# Patient Record
Sex: MALE | Race: OTHER | HISPANIC OR LATINO | URBAN - METROPOLITAN AREA
[De-identification: names, ages, dates, MRNs, and addresses within clinical notes are randomized per-mention and may not be internally consistent; named-entity substitution may affect disease eponyms.]

---

## 2017-01-09 ENCOUNTER — OUTPATIENT (OUTPATIENT)
Dept: OUTPATIENT SERVICES | Facility: HOSPITAL | Age: 52
LOS: 1 days | End: 2017-01-09
Payer: SELF-PAY

## 2017-01-09 ENCOUNTER — APPOINTMENT (OUTPATIENT)
Dept: FAMILY MEDICINE | Facility: HOSPITAL | Age: 52
End: 2017-01-09

## 2017-01-09 VITALS — DIASTOLIC BLOOD PRESSURE: 86 MMHG | SYSTOLIC BLOOD PRESSURE: 128 MMHG

## 2017-01-09 VITALS
HEART RATE: 59 BPM | RESPIRATION RATE: 14 BRPM | WEIGHT: 161 LBS | TEMPERATURE: 98 F | BODY MASS INDEX: 27.15 KG/M2 | DIASTOLIC BLOOD PRESSURE: 94 MMHG | HEIGHT: 64.5 IN | OXYGEN SATURATION: 99 % | SYSTOLIC BLOOD PRESSURE: 144 MMHG

## 2017-01-09 PROCEDURE — G0463: CPT

## 2017-01-16 ENCOUNTER — FORM ENCOUNTER (OUTPATIENT)
Age: 52
End: 2017-01-16

## 2017-01-17 ENCOUNTER — OUTPATIENT (OUTPATIENT)
Dept: OUTPATIENT SERVICES | Facility: HOSPITAL | Age: 52
LOS: 1 days | End: 2017-01-17
Payer: SELF-PAY

## 2017-01-17 PROCEDURE — 87340 HEPATITIS B SURFACE AG IA: CPT

## 2017-01-17 PROCEDURE — 84443 ASSAY THYROID STIM HORMONE: CPT

## 2017-01-17 PROCEDURE — 84439 ASSAY OF FREE THYROXINE: CPT

## 2017-01-17 PROCEDURE — 36415 COLL VENOUS BLD VENIPUNCTURE: CPT

## 2017-01-17 PROCEDURE — 76700 US EXAM ABDOM COMPLETE: CPT

## 2017-01-17 PROCEDURE — 82607 VITAMIN B-12: CPT

## 2017-01-17 PROCEDURE — 82306 VITAMIN D 25 HYDROXY: CPT

## 2017-01-17 PROCEDURE — 86140 C-REACTIVE PROTEIN: CPT

## 2017-01-17 PROCEDURE — 82652 VIT D 1 25-DIHYDROXY: CPT

## 2017-01-17 PROCEDURE — 76700 US EXAM ABDOM COMPLETE: CPT | Mod: 26

## 2017-01-17 PROCEDURE — 80076 HEPATIC FUNCTION PANEL: CPT

## 2017-01-17 PROCEDURE — 87902 NFCT AGT GNTYP ALYS HEP C: CPT

## 2018-04-02 ENCOUNTER — CHART COPY (OUTPATIENT)
Age: 53
End: 2018-04-02

## 2018-04-11 ENCOUNTER — APPOINTMENT (OUTPATIENT)
Dept: FAMILY MEDICINE | Facility: HOSPITAL | Age: 53
End: 2018-04-11

## 2018-04-11 ENCOUNTER — OUTPATIENT (OUTPATIENT)
Dept: OUTPATIENT SERVICES | Facility: HOSPITAL | Age: 53
LOS: 1 days | End: 2018-04-11
Payer: SELF-PAY

## 2018-04-11 VITALS
WEIGHT: 158 LBS | HEART RATE: 65 BPM | SYSTOLIC BLOOD PRESSURE: 139 MMHG | BODY MASS INDEX: 26.7 KG/M2 | TEMPERATURE: 98.4 F | DIASTOLIC BLOOD PRESSURE: 89 MMHG | RESPIRATION RATE: 16 BRPM | OXYGEN SATURATION: 98 %

## 2018-04-11 DIAGNOSIS — Z00.00 ENCOUNTER FOR GENERAL ADULT MEDICAL EXAMINATION WITHOUT ABNORMAL FINDINGS: ICD-10-CM

## 2018-04-11 PROCEDURE — 36415 COLL VENOUS BLD VENIPUNCTURE: CPT

## 2018-04-11 PROCEDURE — 80061 LIPID PANEL: CPT

## 2018-04-11 PROCEDURE — 80053 COMPREHEN METABOLIC PANEL: CPT

## 2018-04-11 PROCEDURE — G0463: CPT

## 2018-04-11 PROCEDURE — 83036 HEMOGLOBIN GLYCOSYLATED A1C: CPT

## 2018-04-11 PROCEDURE — 85025 COMPLETE CBC W/AUTO DIFF WBC: CPT

## 2018-04-12 DIAGNOSIS — K64.4 RESIDUAL HEMORRHOIDAL SKIN TAGS: ICD-10-CM

## 2018-04-12 DIAGNOSIS — H52.10 MYOPIA, UNSPECIFIED EYE: ICD-10-CM

## 2018-04-24 ENCOUNTER — APPOINTMENT (OUTPATIENT)
Dept: OPHTHALMOLOGY | Facility: CLINIC | Age: 53
End: 2018-04-24

## 2018-04-24 ENCOUNTER — OUTPATIENT (OUTPATIENT)
Dept: OUTPATIENT SERVICES | Facility: HOSPITAL | Age: 53
LOS: 1 days | End: 2018-04-24

## 2018-09-12 DIAGNOSIS — H11.042 PERIPHERAL PTERYGIUM, STATIONARY, LEFT EYE: ICD-10-CM

## 2019-12-27 ENCOUNTER — FORM ENCOUNTER (OUTPATIENT)
Age: 54
End: 2019-12-27

## 2019-12-28 ENCOUNTER — OUTPATIENT (OUTPATIENT)
Dept: OUTPATIENT SERVICES | Facility: HOSPITAL | Age: 54
LOS: 1 days | End: 2019-12-28
Payer: SELF-PAY

## 2019-12-28 ENCOUNTER — APPOINTMENT (OUTPATIENT)
Dept: FAMILY MEDICINE | Facility: HOSPITAL | Age: 54
End: 2019-12-28
Payer: SELF-PAY

## 2019-12-28 DIAGNOSIS — Z00.00 ENCOUNTER FOR GENERAL ADULT MEDICAL EXAMINATION WITHOUT ABNORMAL FINDINGS: ICD-10-CM

## 2019-12-28 DIAGNOSIS — K64.4 RESIDUAL HEMORRHOIDAL SKIN TAGS: ICD-10-CM

## 2019-12-28 PROCEDURE — G0463: CPT

## 2019-12-28 PROCEDURE — 73562 X-RAY EXAM OF KNEE 3: CPT | Mod: 26,RT

## 2019-12-28 PROCEDURE — 90471 IMMUNIZATION ADMIN: CPT

## 2019-12-28 PROCEDURE — 73562 X-RAY EXAM OF KNEE 3: CPT

## 2019-12-28 RX ORDER — AVOBENZONE, HOMOSALATE, OCTISALATE, OCTOCRYLENE, AND OXYBENZONE 29.4; 147; 49; 25.4; 58.8 MG/ML; MG/ML; MG/ML; MG/ML; MG/ML
51.7 LOTION TOPICAL DAILY
Qty: 1 | Refills: 0 | Status: COMPLETED | COMMUNITY
Start: 2018-04-11 | End: 2019-12-28

## 2019-12-28 RX ORDER — HYDROCORTISONE 25 MG/G
2.5 CREAM TOPICAL DAILY
Qty: 1 | Refills: 0 | Status: COMPLETED | COMMUNITY
Start: 2018-04-11 | End: 2019-12-28

## 2019-12-28 RX ORDER — PROPYLENE GLYCOL/PEG 400 0.3 %-0.4%
0.025-0.3 DROPS OPHTHALMIC (EYE) TWICE DAILY
Qty: 1 | Refills: 0 | Status: COMPLETED | COMMUNITY
Start: 2018-04-11 | End: 2019-12-28

## 2019-12-28 NOTE — ASSESSMENT
[FreeTextEntry1] : 54 year old male as above\par \par #right knee pain\par - xray of right knee\par - ibuprofen 600mg TID for pain\par - apply knee brace from OTC\par - stretching exercise discussed and demonstrated with patient\par \par #Preventitive measure\par - tdap vaccination given\par - pt tolerated vaccination well\par \par \par f/u with PCP in 2 weeks \par \par case discussed with Dr. Martino

## 2019-12-28 NOTE — HISTORY OF PRESENT ILLNESS
[FreeTextEntry8] : 54 year old male here for tdap vaccination and also c/o of right knee pain for x 2 weeks. Pt denies any trauma, but reports he had 8/10 pain which has improved to 5/10 and is more aggravated with movement and better with rest. Pt reports taking Tylenol which help support, however, reports occasionally hearing a clicking noise with his right knee. 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 Pt denies any trauma or falls. pt has no further complaints.

## 2019-12-28 NOTE — PHYSICAL EXAM
[No Acute Distress] : no acute distress [Well Nourished] : well nourished [Well Developed] : well developed [Well-Appearing] : well-appearing [Normal Outer Ear/Nose] : the outer ears and nose were normal in appearance [Normal Oropharynx] : the oropharynx was normal [No JVD] : no jugular venous distention [No Lymphadenopathy] : no lymphadenopathy [No Respiratory Distress] : no respiratory distress  [Supple] : supple [Clear to Auscultation] : lungs were clear to auscultation bilaterally [No Accessory Muscle Use] : no accessory muscle use [Normal S1, S2] : normal S1 and S2 [Regular Rhythm] : with a regular rhythm [Normal Rate] : normal rate  [Non-distended] : non-distended [Non Tender] : non-tender [Soft] : abdomen soft [Normal Bowel Sounds] : normal bowel sounds [No HSM] : no HSM [Grossly Normal Strength/Tone] : grossly normal strength/tone [No Joint Swelling] : no joint swelling [No Focal Deficits] : no focal deficits [Coordination Grossly Intact] : coordination grossly intact [No Rash] : no rash [Normal Insight/Judgement] : insight and judgment were intact [Normal Affect] : the affect was normal [Normal Gait] : normal gait [de-identified] : no tenderness, swelling or erythema of bilateral knees, range of movement intact of bilateral extremities. no weakness.

## 2019-12-30 DIAGNOSIS — M25.561 PAIN IN RIGHT KNEE: ICD-10-CM

## 2019-12-30 DIAGNOSIS — Z23 ENCOUNTER FOR IMMUNIZATION: ICD-10-CM

## 2020-01-10 ENCOUNTER — APPOINTMENT (OUTPATIENT)
Dept: FAMILY MEDICINE | Facility: HOSPITAL | Age: 55
End: 2020-01-10

## 2020-01-10 ENCOUNTER — OUTPATIENT (OUTPATIENT)
Dept: OUTPATIENT SERVICES | Facility: HOSPITAL | Age: 55
LOS: 1 days | End: 2020-01-10
Payer: COMMERCIAL

## 2020-01-10 ENCOUNTER — OUTPATIENT (OUTPATIENT)
Dept: OUTPATIENT SERVICES | Facility: HOSPITAL | Age: 55
LOS: 1 days | End: 2020-01-10
Payer: SELF-PAY

## 2020-01-10 VITALS
OXYGEN SATURATION: 96 % | RESPIRATION RATE: 16 BRPM | SYSTOLIC BLOOD PRESSURE: 142 MMHG | TEMPERATURE: 98.5 F | WEIGHT: 164 LBS | HEART RATE: 77 BPM | DIASTOLIC BLOOD PRESSURE: 90 MMHG | BODY MASS INDEX: 27.72 KG/M2

## 2020-01-10 DIAGNOSIS — Z00.00 ENCOUNTER FOR GENERAL ADULT MEDICAL EXAMINATION WITHOUT ABNORMAL FINDINGS: ICD-10-CM

## 2020-01-10 PROCEDURE — G0463: CPT

## 2020-01-10 NOTE — PLAN
[FreeTextEntry1] : 54M w/PMH of obesity, elevated lipids, sciatica presents for F/U R knee pain\par \par #R knee pain\par - Continue Ibuprofen PRN\par - Continue knee brace\par - Physical therapy referral for strengthening\par \par #HM\par #Hx of transaminitis\par - F/U CBC, CMP, lipids, A1c\par - RTC in 2 weeks for CPE\par \par Case discussed w/ Dr Child

## 2020-01-10 NOTE — PHYSICAL EXAM
[No Acute Distress] : no acute distress [Well Nourished] : well nourished [Well Developed] : well developed [Well-Appearing] : well-appearing [de-identified] : FADIR and JULIETTE negative\par No gait abnormalities\par Knee: No warmth, No TTP; No clicking elicited on active/passive movement; Negative Lachman's; Negative posterior Drawers; Negative Thessaly

## 2020-01-10 NOTE — REVIEW OF SYSTEMS
[Joint Pain] : joint pain [Fever] : no fever [Joint Stiffness] : no joint stiffness [Chills] : no chills [Muscle Weakness] : no muscle weakness [Muscle Pain] : no muscle pain [Back Pain] : no back pain [Joint Swelling] : no joint swelling

## 2020-01-10 NOTE — HISTORY OF PRESENT ILLNESS
[FreeTextEntry1] : F/U knee pain [de-identified] : 54M w/PMH of obesity, elevated lipids, sciatica presents for F/U R knee pain. Last visit c/o 2 weeks of waxing / waning 5/10 pain, worse w/ movement better w/ rest or Tylenol. Denied trauma. Endorsed clicking. Today pt reports knee pain somewhat improved. Adherent w/ Ibuprofen 600mg q8 PRN, but knee pain mostly resolves within <10mins so has not taken much. Reports wearing brace helps. Xray of knee negative. Otherwise no complaints.

## 2020-01-14 ENCOUNTER — OUTPATIENT (OUTPATIENT)
Dept: OUTPATIENT SERVICES | Facility: HOSPITAL | Age: 55
LOS: 1 days | End: 2020-01-14

## 2020-01-14 DIAGNOSIS — M25.561 PAIN IN RIGHT KNEE: ICD-10-CM

## 2020-01-14 DIAGNOSIS — R74.0 NONSPECIFIC ELEVATION OF LEVELS OF TRANSAMINASE AND LACTIC ACID DEHYDROGENASE [LDH]: ICD-10-CM

## 2020-01-14 DIAGNOSIS — Z12.11 ENCOUNTER FOR SCREENING FOR MALIGNANT NEOPLASM OF COLON: ICD-10-CM

## 2020-01-14 LAB
ALBUMIN SERPL ELPH-MCNC: 4.3 G/DL
ALP BLD-CCNC: 97 U/L
ALT SERPL-CCNC: 38 U/L
ANION GAP SERPL CALC-SCNC: 11 MMOL/L
AST SERPL-CCNC: 27 U/L
BILIRUB SERPL-MCNC: 0.9 MG/DL
BUN SERPL-MCNC: 14 MG/DL
CALCIUM SERPL-MCNC: 9.1 MG/DL
CHLORIDE SERPL-SCNC: 106 MMOL/L
CO2 SERPL-SCNC: 25 MMOL/L
CREAT SERPL-MCNC: 0.86 MG/DL
ESTIMATED AVERAGE GLUCOSE: 94 MG/DL
GLUCOSE SERPL-MCNC: 98 MG/DL
HBA1C MFR BLD HPLC: 4.9 %
POTASSIUM SERPL-SCNC: 4.4 MMOL/L
PROT SERPL-MCNC: 7.1 G/DL
SODIUM SERPL-SCNC: 142 MMOL/L

## 2020-01-15 PROCEDURE — 83036 HEMOGLOBIN GLYCOSYLATED A1C: CPT

## 2020-01-15 PROCEDURE — G0463: CPT

## 2020-01-15 PROCEDURE — 80061 LIPID PANEL: CPT

## 2020-01-15 PROCEDURE — 80053 COMPREHEN METABOLIC PANEL: CPT

## 2020-01-15 PROCEDURE — 82274 ASSAY TEST FOR BLOOD FECAL: CPT

## 2020-01-28 LAB
BASOPHILS # BLD AUTO: 0.07 K/UL
BASOPHILS NFR BLD AUTO: 1.2 %
CHOLEST SERPL-MCNC: 261 MG/DL
CHOLEST/HDLC SERPL: 6.4 RATIO
EOSINOPHIL # BLD AUTO: 0.32 K/UL
EOSINOPHIL NFR BLD AUTO: 5.5 %
HCT VFR BLD CALC: 44.9 %
HDLC SERPL-MCNC: 41 MG/DL
HEMOCCULT STL QL IA: NEGATIVE
HGB BLD-MCNC: 15.3 G/DL
IMM GRANULOCYTES NFR BLD AUTO: 0.7 %
LDLC SERPL CALC-MCNC: 179 MG/DL
LYMPHOCYTES # BLD AUTO: 2.14 K/UL
LYMPHOCYTES NFR BLD AUTO: 36.5 %
MAN DIFF?: NORMAL
MCHC RBC-ENTMCNC: 31 PG
MCHC RBC-ENTMCNC: 34.1 GM/DL
MCV RBC AUTO: 90.9 FL
MONOCYTES # BLD AUTO: 0.49 K/UL
MONOCYTES NFR BLD AUTO: 8.4 %
NEUTROPHILS # BLD AUTO: 2.8 K/UL
NEUTROPHILS NFR BLD AUTO: 47.7 %
PLATELET # BLD AUTO: 377 K/UL
RBC # BLD: 4.94 M/UL
RBC # FLD: 13.1 %
TRIGL SERPL-MCNC: 206 MG/DL
WBC # FLD AUTO: 5.86 K/UL

## 2020-01-29 ENCOUNTER — OUTPATIENT (OUTPATIENT)
Dept: OUTPATIENT SERVICES | Facility: HOSPITAL | Age: 55
LOS: 1 days | End: 2020-01-29
Payer: SELF-PAY

## 2020-01-29 ENCOUNTER — APPOINTMENT (OUTPATIENT)
Dept: FAMILY MEDICINE | Facility: HOSPITAL | Age: 55
End: 2020-01-29

## 2020-01-29 VITALS
DIASTOLIC BLOOD PRESSURE: 95 MMHG | BODY MASS INDEX: 28.05 KG/M2 | HEART RATE: 68 BPM | SYSTOLIC BLOOD PRESSURE: 144 MMHG | WEIGHT: 166 LBS | OXYGEN SATURATION: 98 % | TEMPERATURE: 97.5 F | RESPIRATION RATE: 16 BRPM

## 2020-01-29 DIAGNOSIS — Z00.00 ENCOUNTER FOR GENERAL ADULT MEDICAL EXAMINATION WITHOUT ABNORMAL FINDINGS: ICD-10-CM

## 2020-01-29 DIAGNOSIS — R20.0 ANESTHESIA OF SKIN: ICD-10-CM

## 2020-01-29 DIAGNOSIS — Z12.11 ENCOUNTER FOR SCREENING FOR MALIGNANT NEOPLASM OF COLON: ICD-10-CM

## 2020-01-29 DIAGNOSIS — Z23 ENCOUNTER FOR IMMUNIZATION: ICD-10-CM

## 2020-01-29 DIAGNOSIS — E78.5 HYPERLIPIDEMIA, UNSPECIFIED: ICD-10-CM

## 2020-01-29 DIAGNOSIS — Z86.19 PERSONAL HISTORY OF OTHER INFECTIOUS AND PARASITIC DISEASES: ICD-10-CM

## 2020-01-29 PROCEDURE — G0463: CPT

## 2020-01-29 NOTE — REVIEW OF SYSTEMS
[Joint Pain] : joint pain [Fever] : no fever [Chills] : no chills [Chest Pain] : no chest pain [Palpitations] : no palpitations [Shortness Of Breath] : no shortness of breath [Abdominal Pain] : no abdominal pain [Cough] : no cough [Vomiting] : no vomiting [Nausea] : no nausea [Constipation] : no constipation [Diarrhea] : no diarrhea [Muscle Weakness] : no muscle weakness [Joint Stiffness] : no joint stiffness [Muscle Pain] : no muscle pain [Joint Swelling] : no joint swelling [Back Pain] : no back pain

## 2020-01-29 NOTE — HISTORY OF PRESENT ILLNESS
[FreeTextEntry1] : CPE [de-identified] : 54M w/PMH of obesity, HLD, sciatica presents for CPE. Since last visit, R knee pain improved. Endorses adherence w/ ibuprofen PRN. Attended PT, feels exercises greatly helping. January 2020 labs significant for LDL of 179. Pt has no complaints at this time and denies fever/chills, NVD, weight loss/gain, CP, SoB.

## 2020-01-30 DIAGNOSIS — I10 ESSENTIAL (PRIMARY) HYPERTENSION: ICD-10-CM

## 2020-03-28 ENCOUNTER — EMERGENCY (EMERGENCY)
Facility: HOSPITAL | Age: 55
LOS: 1 days | Discharge: HOME | End: 2020-03-28
Attending: EMERGENCY MEDICINE | Admitting: EMERGENCY MEDICINE
Payer: MEDICAID

## 2020-03-28 VITALS
HEART RATE: 88 BPM | RESPIRATION RATE: 16 BRPM | DIASTOLIC BLOOD PRESSURE: 99 MMHG | OXYGEN SATURATION: 98 % | SYSTOLIC BLOOD PRESSURE: 160 MMHG

## 2020-03-28 VITALS
OXYGEN SATURATION: 98 % | HEART RATE: 87 BPM | DIASTOLIC BLOOD PRESSURE: 115 MMHG | HEIGHT: 66 IN | WEIGHT: 162.04 LBS | SYSTOLIC BLOOD PRESSURE: 195 MMHG | TEMPERATURE: 98 F | RESPIRATION RATE: 16 BRPM

## 2020-03-28 DIAGNOSIS — I10 ESSENTIAL (PRIMARY) HYPERTENSION: ICD-10-CM

## 2020-03-28 PROCEDURE — 99283 EMERGENCY DEPT VISIT LOW MDM: CPT

## 2020-03-28 RX ORDER — ALPRAZOLAM 0.25 MG
0.25 TABLET ORAL ONCE
Refills: 0 | Status: DISCONTINUED | OUTPATIENT
Start: 2020-03-28 | End: 2020-03-28

## 2020-03-28 RX ORDER — ALPRAZOLAM 0.25 MG
1 TABLET ORAL
Qty: 10 | Refills: 0
Start: 2020-03-28 | End: 2020-04-01

## 2020-03-28 RX ADMIN — Medication 0.25 MILLIGRAM(S): at 20:05

## 2020-03-28 NOTE — ED PROVIDER NOTE - PATIENT PORTAL LINK FT
You can access the FollowMyHealth Patient Portal offered by [facility] by registering at the following website: [email or web address]. By joining [facility]’s FollowMyHealth portal, you will also be able to view your health information using other applications (apps) compatible with our system.

## 2020-03-28 NOTE — ED PROVIDER NOTE - NSFOLLOWUPCLINICS_GEN_ALL_ED_FT
[facility] Practice Clinic  Family Medicine  [address] [place] [id number]  Phone: [phone]  Fax:   Follow Up Time:

## 2020-03-28 NOTE — ED PROVIDER NOTE - CLINICAL SUMMARY MEDICAL DECISION MAKING FREE TEXT BOX
pt presented with asymptomatic HTN, pt also under emotional stress due loss of parent yesterday. pt reassured and prescribe mild anxiolytic for short period and f/u with PMD

## 2020-03-28 NOTE — ED ADULT NURSE NOTE - NSIMPLEMENTINTERV_GEN_ALL_ED
today
Implemented All Universal Safety Interventions:  [place] to call system. Call bell, personal items and telephone within reach. Instruct patient to call for assistance. Room bathroom lighting operational. Non-slip footwear when patient is off stretcher. Physically safe environment: no spills, clutter or unnecessary equipment. Stretcher in lowest position, wheels locked, appropriate side rails in place.

## 2020-03-28 NOTE — ED ADULT NURSE NOTE - OBJECTIVE STATEMENT
Pt presents to the ER complaining of high blood pressure. Pt states that he took his blood pressure and it was extremely high at home.

## 2020-03-28 NOTE — ED PROVIDER NOTE - OBJECTIVE STATEMENT
53 y/o male with no sig pmhx presents to ed c/o his BP is high, states he is under lot of stress, his father  yesterday in his country and he can not go.   denies any complaints to chest pain, headache or SOB, he was diagnosed with htn  a month ago and was told to make life style modifications and than reevaluate .

## 2020-03-31 ENCOUNTER — OUTPATIENT (OUTPATIENT)
Dept: OUTPATIENT SERVICES | Facility: HOSPITAL | Age: 55
LOS: 1 days | End: 2020-03-31
Payer: SELF-PAY

## 2020-03-31 ENCOUNTER — APPOINTMENT (OUTPATIENT)
Dept: FAMILY MEDICINE | Facility: HOSPITAL | Age: 55
End: 2020-03-31

## 2020-03-31 VITALS
HEART RATE: 79 BPM | BODY MASS INDEX: 27.72 KG/M2 | OXYGEN SATURATION: 96 % | RESPIRATION RATE: 16 BRPM | TEMPERATURE: 98.1 F | DIASTOLIC BLOOD PRESSURE: 84 MMHG | SYSTOLIC BLOOD PRESSURE: 144 MMHG | WEIGHT: 164 LBS

## 2020-03-31 DIAGNOSIS — Z00.00 ENCOUNTER FOR GENERAL ADULT MEDICAL EXAMINATION WITHOUT ABNORMAL FINDINGS: ICD-10-CM

## 2020-03-31 PROBLEM — Z78.9 OTHER SPECIFIED HEALTH STATUS: Chronic | Status: ACTIVE | Noted: 2020-03-28

## 2020-03-31 PROCEDURE — G0463: CPT

## 2020-03-31 RX ORDER — IBUPROFEN 600 MG/1
600 TABLET, FILM COATED ORAL
Qty: 21 | Refills: 0 | Status: COMPLETED | COMMUNITY
Start: 2019-12-28 | End: 2020-03-31

## 2020-03-31 NOTE — ASSESSMENT
[FreeTextEntry1] : 55 y/o M PMHx HLD and sciatica presenting for f/u after ED visit last week, 3/27, for hypertensive emergency /115.

## 2020-03-31 NOTE — PLAN
[FreeTextEntry1] : HTN\par -Start HCTZ 12.5mg PO QD\par -Pt instructed to monitor BP with home cuff\par -Will call pt in 2 weeks to f/u\par -Make f/u visit in 1 month\par \par Bereavement \par -SW referral and appt made with Janet \par -Janet saw pt during appt and will call him this week, 4/1\par \par

## 2020-03-31 NOTE — PHYSICAL EXAM
[No Acute Distress] : no acute distress [Well Nourished] : well nourished [Well-Appearing] : well-appearing [Normal Sclera/Conjunctiva] : normal sclera/conjunctiva [PERRL] : pupils equal round and reactive to light [EOMI] : extraocular movements intact [No Respiratory Distress] : no respiratory distress  [Clear to Auscultation] : lungs were clear to auscultation bilaterally [Normal Rate] : normal rate  [Regular Rhythm] : with a regular rhythm [Normal S1, S2] : normal S1 and S2 [Soft] : abdomen soft [Non Tender] : non-tender [Non-distended] : non-distended [Normal Bowel Sounds] : normal bowel sounds [Normal Affect] : the affect was normal [Normal Insight/Judgement] : insight and judgment were intact

## 2020-03-31 NOTE — REVIEW OF SYSTEMS
[Fever] : no fever [Chills] : no chills [Pain] : no pain [Vision Problems] : no vision problems [Chest Pain] : no chest pain [Palpitations] : no palpitations [Shortness Of Breath] : no shortness of breath [Cough] : no cough [Abdominal Pain] : no abdominal pain [Nausea] : no nausea [Vomiting] : no vomiting [Suicidal] : not suicidal [Anxiety] : no anxiety [Depression] : no depression

## 2020-03-31 NOTE — HISTORY OF PRESENT ILLNESS
[FreeTextEntry8] : 53 y/o M PMHx HLD and sciatica presenting for f/u after ED visit last week, 3/27, for hypertensive emergency /115. Pt also had c/o HA and tingling in hands at that time. Discharged with alprazolam 0.25mg PRN; pt's father passed away the day before in his home country. Today feeling well. Denies HA, vision disturbance, tingling in hands, CP, SOB, cough, or fever. He has a home BP cuff and notes his BP averages around 150 systolic. When he gets stressed he notices his HA appear and his BP goes up. PHQ-9 score in office: 2.

## 2020-04-01 DIAGNOSIS — I10 ESSENTIAL (PRIMARY) HYPERTENSION: ICD-10-CM

## 2020-04-01 DIAGNOSIS — Z71.89 OTHER SPECIFIED COUNSELING: ICD-10-CM

## 2021-01-26 ENCOUNTER — OUTPATIENT (OUTPATIENT)
Dept: OUTPATIENT SERVICES | Facility: HOSPITAL | Age: 56
LOS: 1 days | End: 2021-01-26
Payer: SELF-PAY

## 2021-01-26 ENCOUNTER — APPOINTMENT (OUTPATIENT)
Dept: FAMILY MEDICINE | Facility: HOSPITAL | Age: 56
End: 2021-01-26

## 2021-01-26 VITALS
RESPIRATION RATE: 14 BRPM | SYSTOLIC BLOOD PRESSURE: 154 MMHG | DIASTOLIC BLOOD PRESSURE: 92 MMHG | OXYGEN SATURATION: 97 % | BODY MASS INDEX: 27.21 KG/M2 | HEART RATE: 72 BPM | TEMPERATURE: 97.1 F | WEIGHT: 161 LBS

## 2021-01-26 VITALS — SYSTOLIC BLOOD PRESSURE: 129 MMHG | DIASTOLIC BLOOD PRESSURE: 84 MMHG

## 2021-01-26 DIAGNOSIS — Z00.00 ENCOUNTER FOR GENERAL ADULT MEDICAL EXAMINATION WITHOUT ABNORMAL FINDINGS: ICD-10-CM

## 2021-01-26 PROCEDURE — G0463: CPT

## 2021-01-26 PROCEDURE — 85025 COMPLETE CBC W/AUTO DIFF WBC: CPT

## 2021-01-26 PROCEDURE — 80061 LIPID PANEL: CPT

## 2021-01-26 PROCEDURE — 80053 COMPREHEN METABOLIC PANEL: CPT

## 2021-01-26 PROCEDURE — 84443 ASSAY THYROID STIM HORMONE: CPT

## 2021-01-26 PROCEDURE — 83036 HEMOGLOBIN GLYCOSYLATED A1C: CPT

## 2021-01-26 PROCEDURE — 82274 ASSAY TEST FOR BLOOD FECAL: CPT

## 2021-01-27 RX ORDER — HYDROCHLOROTHIAZIDE 12.5 MG/1
12.5 CAPSULE ORAL DAILY
Qty: 30 | Refills: 3 | Status: DISCONTINUED | COMMUNITY
Start: 2020-03-31 | End: 2021-01-27

## 2021-01-29 DIAGNOSIS — I10 ESSENTIAL (PRIMARY) HYPERTENSION: ICD-10-CM

## 2021-01-29 NOTE — PLAN
[FreeTextEntry1] : 55M w/PMH of obesity, HLD, sciatica presents for CPE. \par \par # Visual disturbances\par - Refer to opthalmology\par \par #HTN\par - Repeat BP in clinic 129/84\par - Will bring machine in next visit\par - RTC in 1 months for F/U and labs\par \par #Need for flu vaccine \par - Flu vaccine today \par \par #HM\par - Weight: goal BMI <25. \par - Physical Activity: Advised pt 150 min/wk aerobic activity recommended\par - Medical Nutritional Therapy: Mediterranean diet recommended. \par - Smoking: non-smoker \par - FOBT given\par - Tdap up to date 2019\par - Routine labs ordered.\par \par Case discussed w/ Dr Child\par

## 2021-01-29 NOTE — HISTORY OF PRESENT ILLNESS
[FreeTextEntry1] : CPE [de-identified] : 55M w/PMH of obesity, HTN, HLD, sciatica presents for CPE. BP in clinic in 154/92 although patient states that home BP is lower. Patient was prescribed HCTZ last year but did not take it because he felt it increased his BP. Repeat clinic BP was 129/84. Pt states that he has been having visual disturbances. Pt has no other complaints at this time and denies fever/chills, NVD, weight loss/gain, CP, SoB.

## 2021-02-08 LAB — HEMOCCULT STL QL IA: NEGATIVE

## 2021-02-09 ENCOUNTER — OUTPATIENT (OUTPATIENT)
Dept: OUTPATIENT SERVICES | Facility: HOSPITAL | Age: 56
LOS: 1 days | End: 2021-02-09

## 2021-02-09 DIAGNOSIS — Z00.00 ENCOUNTER FOR GENERAL ADULT MEDICAL EXAMINATION WITHOUT ABNORMAL FINDINGS: ICD-10-CM

## 2021-02-11 LAB
ALBUMIN SERPL ELPH-MCNC: 4.5 G/DL
ALP BLD-CCNC: 105 U/L
ALT SERPL-CCNC: 39 U/L
ANION GAP SERPL CALC-SCNC: 11 MMOL/L
AST SERPL-CCNC: 32 U/L
BASOPHILS # BLD AUTO: 0.08 K/UL
BASOPHILS NFR BLD AUTO: 1.4 %
BILIRUB SERPL-MCNC: 0.6 MG/DL
BUN SERPL-MCNC: 10 MG/DL
CALCIUM SERPL-MCNC: 9.3 MG/DL
CHLORIDE SERPL-SCNC: 105 MMOL/L
CHOLEST SERPL-MCNC: 247 MG/DL
CO2 SERPL-SCNC: 23 MMOL/L
CREAT SERPL-MCNC: 0.86 MG/DL
EOSINOPHIL # BLD AUTO: 0.32 K/UL
EOSINOPHIL NFR BLD AUTO: 5.6 %
ESTIMATED AVERAGE GLUCOSE: 97 MG/DL
GLUCOSE SERPL-MCNC: 102 MG/DL
HBA1C MFR BLD HPLC: 5 %
HCT VFR BLD CALC: 48.7 %
HDLC SERPL-MCNC: 46 MG/DL
HGB BLD-MCNC: 15.9 G/DL
IMM GRANULOCYTES NFR BLD AUTO: 0.3 %
LDLC SERPL CALC-MCNC: 158 MG/DL
LYMPHOCYTES # BLD AUTO: 1.92 K/UL
LYMPHOCYTES NFR BLD AUTO: 33.4 %
MAN DIFF?: NORMAL
MCHC RBC-ENTMCNC: 31.4 PG
MCHC RBC-ENTMCNC: 32.6 GM/DL
MCV RBC AUTO: 96.2 FL
MONOCYTES # BLD AUTO: 0.37 K/UL
MONOCYTES NFR BLD AUTO: 6.4 %
NEUTROPHILS # BLD AUTO: 3.03 K/UL
NEUTROPHILS NFR BLD AUTO: 52.9 %
NONHDLC SERPL-MCNC: 201 MG/DL
PLATELET # BLD AUTO: 346 K/UL
POTASSIUM SERPL-SCNC: 4.2 MMOL/L
PROT SERPL-MCNC: 7.3 G/DL
RBC # BLD: 5.06 M/UL
RBC # FLD: 14.6 %
SODIUM SERPL-SCNC: 140 MMOL/L
TRIGL SERPL-MCNC: 212 MG/DL
TSH SERPL-ACNC: 2.41 UIU/ML
WBC # FLD AUTO: 5.74 K/UL

## 2021-03-10 ENCOUNTER — APPOINTMENT (OUTPATIENT)
Dept: FAMILY MEDICINE | Facility: HOSPITAL | Age: 56
End: 2021-03-10

## 2021-03-10 ENCOUNTER — OUTPATIENT (OUTPATIENT)
Dept: OUTPATIENT SERVICES | Facility: HOSPITAL | Age: 56
LOS: 1 days | End: 2021-03-10
Payer: SELF-PAY

## 2021-03-10 VITALS
OXYGEN SATURATION: 96 % | DIASTOLIC BLOOD PRESSURE: 85 MMHG | BODY MASS INDEX: 26.7 KG/M2 | TEMPERATURE: 97.8 F | SYSTOLIC BLOOD PRESSURE: 134 MMHG | RESPIRATION RATE: 18 BRPM | WEIGHT: 158 LBS | HEART RATE: 59 BPM

## 2021-03-10 DIAGNOSIS — Z71.89 OTHER SPECIFIED COUNSELING: ICD-10-CM

## 2021-03-10 DIAGNOSIS — Z86.79 PERSONAL HISTORY OF OTHER DISEASES OF THE CIRCULATORY SYSTEM: ICD-10-CM

## 2021-03-10 DIAGNOSIS — Z00.00 ENCOUNTER FOR GENERAL ADULT MEDICAL EXAMINATION WITHOUT ABNORMAL FINDINGS: ICD-10-CM

## 2021-03-10 PROCEDURE — G0463: CPT

## 2021-03-12 DIAGNOSIS — R03.0 ELEVATED BLOOD-PRESSURE READING, WITHOUT DIAGNOSIS OF HYPERTENSION: ICD-10-CM

## 2021-03-12 DIAGNOSIS — R10.11 RIGHT UPPER QUADRANT PAIN: ICD-10-CM

## 2021-03-12 NOTE — REVIEW OF SYSTEMS
[Fever] : no fever [Chills] : no chills [Fatigue] : no fatigue [Discharge] : no discharge [Nasal Discharge] : no nasal discharge [Sore Throat] : no sore throat [Chest Pain] : no chest pain [Shortness Of Breath] : no shortness of breath [Cough] : no cough [Abdominal Pain] : no abdominal pain [Nausea] : no nausea [Diarrhea] : no diarrhea [Vomiting] : no vomiting

## 2021-03-12 NOTE — HISTORY OF PRESENT ILLNESS
[FreeTextEntry1] : F/u for BP check and Lab results [de-identified] : 55M w/PMH of obesity, HTN, HLD, sciatica presents for F/u for BP check and Lab results . Patient last seen in 01/26/2021 for CPE and CSP.  BP in clinic was 154/92 with repeat 129/84. Not on BP medication at the moment. At that time patient was given an opthalmology referral for visual disturbances. Patient states that he tried to make an appointment but couldn't because clinic closed down. Moreover, patient states that three weeks ago he experienced intermittent sharp RUQ pain that radiated to his back. Pain severity reached 9/10 and responded well to Advil. Patient did not experience fevers, chills, N/V. Episode lasted 5 days.  Pt has no other complaints at this time and denies fever/chills, NVD, weight loss/gain, CP, SoB. \par

## 2021-03-12 NOTE — PHYSICAL EXAM
[No Acute Distress] : no acute distress [Well Nourished] : well nourished [Well Developed] : well developed [Well-Appearing] : well-appearing [Normal Sclera/Conjunctiva] : normal sclera/conjunctiva [PERRL] : pupils equal round and reactive to light [EOMI] : extraocular movements intact [Normal Outer Ear/Nose] : the outer ears and nose were normal in appearance [Normal Oropharynx] : the oropharynx was normal [No JVD] : no jugular venous distention [No Lymphadenopathy] : no lymphadenopathy [Supple] : supple [Thyroid Normal, No Nodules] : the thyroid was normal and there were no nodules present [No Respiratory Distress] : no respiratory distress  [No Accessory Muscle Use] : no accessory muscle use [Clear to Auscultation] : lungs were clear to auscultation bilaterally [Normal Rate] : normal rate  [Regular Rhythm] : with a regular rhythm [Normal S1, S2] : normal S1 and S2 [No Murmur] : no murmur heard [No Carotid Bruits] : no carotid bruits [No Abdominal Bruit] : a ~M bruit was not heard ~T in the abdomen [No Varicosities] : no varicosities [Pedal Pulses Present] : the pedal pulses are present [No Edema] : there was no peripheral edema [No Palpable Aorta] : no palpable aorta [No Extremity Clubbing/Cyanosis] : no extremity clubbing/cyanosis [Soft] : abdomen soft [Non Tender] : non-tender [Non-distended] : non-distended [No Masses] : no abdominal mass palpated [No HSM] : no HSM [Normal Bowel Sounds] : normal bowel sounds [Normal Posterior Cervical Nodes] : no posterior cervical lymphadenopathy [Normal Anterior Cervical Nodes] : no anterior cervical lymphadenopathy [No CVA Tenderness] : no CVA  tenderness [No Spinal Tenderness] : no spinal tenderness [No Joint Swelling] : no joint swelling [Grossly Normal Strength/Tone] : grossly normal strength/tone [No Rash] : no rash [Coordination Grossly Intact] : coordination grossly intact [No Focal Deficits] : no focal deficits [Normal Gait] : normal gait [Deep Tendon Reflexes (DTR)] : deep tendon reflexes were 2+ and symmetric [Normal Affect] : the affect was normal [Normal Insight/Judgement] : insight and judgment were intact [de-identified] : negative Bull sign

## 2021-03-12 NOTE — PLAN
[FreeTextEntry1] : 55M w/PMH of obesity, HLD, sciatica presents for BP check and lab results\par \par #Abdominal Pain RUQ likely 2/2 Biliary colic\par - resolved >2 weeks ago\par - Will follow up clinically\par - avoid fatty foods\par \par # Visual disturbances\par - Refer to opthalmology again\par \par #Elevated Blood pressures\par - todays /85\par - Will continue to exercise and decrease salt in diet\par \par #HM\par - Weight: goal BMI <25. \par - Physical Activity: Advised pt 150 min/wk aerobic activity recommended\par - Medical Nutritional Therapy: Mediterranean diet recommended. \par - Smoking: non-smoker \par - FOBT negative\par - Tdap up to date 2019\par \par RTC in 4 months for BP check/ RUQ pain f/u. \par \par Case discussed w/ Dr Gaston\par

## 2021-04-27 ENCOUNTER — APPOINTMENT (OUTPATIENT)
Dept: OPHTHALMOLOGY | Facility: CLINIC | Age: 56
End: 2021-04-27

## 2021-06-18 ENCOUNTER — EMERGENCY (EMERGENCY)
Facility: HOSPITAL | Age: 56
LOS: 1 days | Discharge: HOME | End: 2021-06-18
Attending: EMERGENCY MEDICINE | Admitting: EMERGENCY MEDICINE
Payer: MEDICAID

## 2021-06-18 VITALS
HEART RATE: 88 BPM | SYSTOLIC BLOOD PRESSURE: 144 MMHG | OXYGEN SATURATION: 97 % | TEMPERATURE: 99 F | RESPIRATION RATE: 16 BRPM | DIASTOLIC BLOOD PRESSURE: 85 MMHG

## 2021-06-18 VITALS
TEMPERATURE: 98 F | RESPIRATION RATE: 16 BRPM | HEART RATE: 110 BPM | DIASTOLIC BLOOD PRESSURE: 102 MMHG | SYSTOLIC BLOOD PRESSURE: 155 MMHG | WEIGHT: 158.07 LBS | OXYGEN SATURATION: 97 % | HEIGHT: 66 IN

## 2021-06-18 LAB
ALBUMIN SERPL ELPH-MCNC: 3.8 G/DL — SIGNIFICANT CHANGE UP (ref 3.3–5)
ALP SERPL-CCNC: 98 U/L — SIGNIFICANT CHANGE UP (ref 40–120)
ALT FLD-CCNC: 45 U/L — SIGNIFICANT CHANGE UP (ref 10–45)
ANION GAP SERPL CALC-SCNC: 11 MMOL/L — SIGNIFICANT CHANGE UP (ref 5–17)
AST SERPL-CCNC: 33 U/L — SIGNIFICANT CHANGE UP (ref 10–40)
BASOPHILS # BLD AUTO: 0.02 K/UL — SIGNIFICANT CHANGE UP (ref 0–0.2)
BASOPHILS NFR BLD AUTO: 0.2 % — SIGNIFICANT CHANGE UP (ref 0–2)
BILIRUB SERPL-MCNC: 1.2 MG/DL — SIGNIFICANT CHANGE UP (ref 0.2–1.2)
BUN SERPL-MCNC: 12 MG/DL — SIGNIFICANT CHANGE UP (ref 7–23)
CALCIUM SERPL-MCNC: 8.5 MG/DL — SIGNIFICANT CHANGE UP (ref 8.4–10.5)
CHLORIDE SERPL-SCNC: 105 MMOL/L — SIGNIFICANT CHANGE UP (ref 96–108)
CO2 SERPL-SCNC: 23 MMOL/L — SIGNIFICANT CHANGE UP (ref 22–31)
CREAT SERPL-MCNC: 0.78 MG/DL — SIGNIFICANT CHANGE UP (ref 0.5–1.3)
EOSINOPHIL # BLD AUTO: 0.09 K/UL — SIGNIFICANT CHANGE UP (ref 0–0.5)
EOSINOPHIL NFR BLD AUTO: 0.8 % — SIGNIFICANT CHANGE UP (ref 0–6)
GLUCOSE SERPL-MCNC: 115 MG/DL — HIGH (ref 70–99)
HCT VFR BLD CALC: 44.5 % — SIGNIFICANT CHANGE UP (ref 39–50)
HGB BLD-MCNC: 16.4 G/DL — SIGNIFICANT CHANGE UP (ref 13–17)
IMM GRANULOCYTES NFR BLD AUTO: 0.3 % — SIGNIFICANT CHANGE UP (ref 0–1.5)
LIDOCAIN IGE QN: 96 U/L — SIGNIFICANT CHANGE UP (ref 73–393)
LYMPHOCYTES # BLD AUTO: 0.63 K/UL — LOW (ref 1–3.3)
LYMPHOCYTES # BLD AUTO: 5.4 % — LOW (ref 13–44)
MCHC RBC-ENTMCNC: 32.5 PG — SIGNIFICANT CHANGE UP (ref 27–34)
MCHC RBC-ENTMCNC: 36.9 GM/DL — HIGH (ref 32–36)
MCV RBC AUTO: 88.3 FL — SIGNIFICANT CHANGE UP (ref 80–100)
MONOCYTES # BLD AUTO: 0.5 K/UL — SIGNIFICANT CHANGE UP (ref 0–0.9)
MONOCYTES NFR BLD AUTO: 4.3 % — SIGNIFICANT CHANGE UP (ref 2–14)
NEUTROPHILS # BLD AUTO: 10.33 K/UL — HIGH (ref 1.8–7.4)
NEUTROPHILS NFR BLD AUTO: 89 % — HIGH (ref 43–77)
NRBC # BLD: 0 /100 WBCS — SIGNIFICANT CHANGE UP (ref 0–0)
PLATELET # BLD AUTO: 331 K/UL — SIGNIFICANT CHANGE UP (ref 150–400)
POTASSIUM SERPL-MCNC: 3.5 MMOL/L — SIGNIFICANT CHANGE UP (ref 3.5–5.3)
POTASSIUM SERPL-SCNC: 3.5 MMOL/L — SIGNIFICANT CHANGE UP (ref 3.5–5.3)
PROT SERPL-MCNC: 7.8 G/DL — SIGNIFICANT CHANGE UP (ref 6–8.3)
RAPID RVP RESULT: SIGNIFICANT CHANGE UP
RBC # BLD: 5.04 M/UL — SIGNIFICANT CHANGE UP (ref 4.2–5.8)
RBC # FLD: 13.1 % — SIGNIFICANT CHANGE UP (ref 10.3–14.5)
SARS-COV-2 RNA SPEC QL NAA+PROBE: SIGNIFICANT CHANGE UP
SODIUM SERPL-SCNC: 139 MMOL/L — SIGNIFICANT CHANGE UP (ref 135–145)
TROPONIN I SERPL-MCNC: <.017 NG/ML — LOW (ref 0.02–0.06)
WBC # BLD: 11.61 K/UL — HIGH (ref 3.8–10.5)
WBC # FLD AUTO: 11.61 K/UL — HIGH (ref 3.8–10.5)

## 2021-06-18 PROCEDURE — 99285 EMERGENCY DEPT VISIT HI MDM: CPT

## 2021-06-18 PROCEDURE — 71045 X-RAY EXAM CHEST 1 VIEW: CPT

## 2021-06-18 PROCEDURE — 96375 TX/PRO/DX INJ NEW DRUG ADDON: CPT

## 2021-06-18 PROCEDURE — 96365 THER/PROPH/DIAG IV INF INIT: CPT

## 2021-06-18 PROCEDURE — 83690 ASSAY OF LIPASE: CPT

## 2021-06-18 PROCEDURE — 36415 COLL VENOUS BLD VENIPUNCTURE: CPT

## 2021-06-18 PROCEDURE — 84484 ASSAY OF TROPONIN QUANT: CPT

## 2021-06-18 PROCEDURE — 93010 ELECTROCARDIOGRAM REPORT: CPT

## 2021-06-18 PROCEDURE — 99284 EMERGENCY DEPT VISIT MOD MDM: CPT | Mod: 25

## 2021-06-18 PROCEDURE — 93005 ELECTROCARDIOGRAM TRACING: CPT

## 2021-06-18 PROCEDURE — 85025 COMPLETE CBC W/AUTO DIFF WBC: CPT

## 2021-06-18 PROCEDURE — 80053 COMPREHEN METABOLIC PANEL: CPT

## 2021-06-18 PROCEDURE — 71045 X-RAY EXAM CHEST 1 VIEW: CPT | Mod: 26

## 2021-06-18 PROCEDURE — 0225U NFCT DS DNA&RNA 21 SARSCOV2: CPT

## 2021-06-18 RX ORDER — ONDANSETRON 8 MG/1
1 TABLET, FILM COATED ORAL
Qty: 16 | Refills: 0
Start: 2021-06-18 | End: 2021-06-21

## 2021-06-18 RX ORDER — ONDANSETRON 8 MG/1
4 TABLET, FILM COATED ORAL ONCE
Refills: 0 | Status: COMPLETED | OUTPATIENT
Start: 2021-06-18 | End: 2021-06-18

## 2021-06-18 RX ORDER — SODIUM CHLORIDE 9 MG/ML
1000 INJECTION INTRAMUSCULAR; INTRAVENOUS; SUBCUTANEOUS ONCE
Refills: 0 | Status: COMPLETED | OUTPATIENT
Start: 2021-06-18 | End: 2021-06-18

## 2021-06-18 RX ORDER — FAMOTIDINE 10 MG/ML
1 INJECTION INTRAVENOUS
Qty: 10 | Refills: 0
Start: 2021-06-18 | End: 2021-06-22

## 2021-06-18 RX ORDER — FAMOTIDINE 10 MG/ML
20 INJECTION INTRAVENOUS ONCE
Refills: 0 | Status: COMPLETED | OUTPATIENT
Start: 2021-06-18 | End: 2021-06-18

## 2021-06-18 RX ADMIN — SODIUM CHLORIDE 1000 MILLILITER(S): 9 INJECTION INTRAMUSCULAR; INTRAVENOUS; SUBCUTANEOUS at 19:45

## 2021-06-18 RX ADMIN — FAMOTIDINE 100 MILLIGRAM(S): 10 INJECTION INTRAVENOUS at 19:11

## 2021-06-18 RX ADMIN — SODIUM CHLORIDE 1000 MILLILITER(S): 9 INJECTION INTRAMUSCULAR; INTRAVENOUS; SUBCUTANEOUS at 18:45

## 2021-06-18 RX ADMIN — ONDANSETRON 4 MILLIGRAM(S): 8 TABLET, FILM COATED ORAL at 18:54

## 2021-06-18 RX ADMIN — FAMOTIDINE 20 MILLIGRAM(S): 10 INJECTION INTRAVENOUS at 19:45

## 2021-06-18 NOTE — ED ADULT NURSE NOTE - OBJECTIVE STATEMENT
Patient presents to ED c/o epigastric pain that began around noon today. Patient denies fevers, states he had one vomiting episode PTA. Patient presents to ED c/o epigastric pain that began around noon today. Patient denies fevers, states he had vomiting episode PTA.

## 2021-06-18 NOTE — ED PROVIDER NOTE - OBJECTIVE STATEMENT
56 y/o M h/o HTN (no meds) pw  4-5 episodes NBNB vomiting, chills accompanied with epigastric discomfort starting this morning. Denies fever, headache, chest pain, shortness of breath, diarrhea, dysuria, weakness, numbness, tingling. Took Advil at 2pm without relief. Had COVID vac x2 last one in April.   Denies smoking/ETOH  No surgical hx  PMD- Family Practice

## 2021-06-18 NOTE — ED PROVIDER NOTE - PATIENT PORTAL LINK FT
You can access the FollowMyHealth Patient Portal offered by [facility] by registering at the following website: [email or web address]. By joining [employer]’s FollowMyHealth portal, you will also be able to view your health information using other applications (apps) compatible with our system.

## 2021-06-18 NOTE — ED PROVIDER NOTE - NSFOLLOWUPINSTRUCTIONS_ED_ALL_ED_FT
Follow up with your PMD within 1-2 days.  Follow up with a Gastroenterologist within the week- referral above or you can call: Find a Physician helpline ([phone]) for assistance   Rest, increase fluids.   Take Tylenol 650mg every 4-6 hours for pain or temp greater than 99.9.   Take Zofran 4mg dissolve 1 tab under tongue every 4-6 hours as needed for nausea or vomiting.   Take Pepcid 20mg 1 tab 2x/day as needed for epigastric discomfort   Eat bland, small meals as tolerated.    Worsening, continued or ANY new concerning symptoms return to the emergency department.       Gastritis    WHAT YOU NEED TO KNOW:    Gastritis is inflammation or irritation of the lining of your stomach.     Abdominal Organs         DISCHARGE INSTRUCTIONS:    Call 911 for any of the following:   •You develop chest pain or shortness of breath.          Seek care immediately if:   •You vomit blood.      •You have black or bloody bowel movements.      •You have severe stomach or back pain.      Contact your healthcare provider if:   •You have a fever.      •You have new or worsening symptoms, even after treatment.      •You have questions or concerns about your condition or care.      Medicines:   •Medicines may be given to help treat a bacterial infection or decrease stomach acid.       •Take your medicine as directed. Contact your healthcare provider if you think your medicine is not helping or if you have side effects. Tell him or her if you are allergic to any medicine. Keep a list of the medicines, vitamins, and herbs you take. Include the amounts, and when and why you take them. Bring the list or the pill bottles to follow-up visits. Carry your medicine list with you in case of an emergency.      Manage or prevent gastritis:   •Do not smoke. Nicotine and other chemicals in cigarettes and cigars can make your symptoms worse and cause lung damage. Ask your healthcare provider for information if you currently smoke and need help to quit. E-cigarettes or smokeless tobacco still contain nicotine. Talk to your healthcare provider before you use these products.       •Do not drink alcohol. Alcohol can prevent healing and make your gastritis worse. Talk to your healthcare provider if you need help to stop drinking.      •Do not take NSAIDs or aspirin unless directed. These and similar medicines can cause irritation. If your healthcare provider says it is okay to take NSAIDs, take them with food.       •Do not eat foods that cause irritation. Foods such as oranges and salsa can cause burning or pain. Eat a variety of healthy foods. Examples include fruits (not citrus), vegetables, low-fat dairy products, beans, whole-grain breads, and lean meats and fish. Try to eat small meals, and drink water with your meals. Do not eat for at least 3 hours before you go to bed.      •Find ways to relax and decrease stress. Stress can increase stomach acid and make gastritis worse. Activities such as yoga, meditation, or listening to music can help you relax. Spend time with friends, or do things you enjoy.      Follow up with your healthcare provider as directed: You may need ongoing tests or treatment, or referral to a gastroenterologist. Write down your questions so you remember to ask them during your visits.

## 2021-06-18 NOTE — ED PROVIDER NOTE - CARE PROVIDER_API CALL
Tristen Viveros (MD)  Gastroenterology; Internal Medicine  [address] [place] [address]  Phone: [phone]  Fax: [phone]  Follow Up Time:

## 2021-06-18 NOTE — ED PROVIDER NOTE - PROGRESS NOTE DETAILS
RUBEN Landis- Labs stable. Trop negative. EKG NSR no acute findings. Feeling better s/p Pepcid/Zofran- Will DC home on Pepcid/Zofran with Family Practice GI follow up. RVP running. Strict ED return precautions discussed. Patient understands and agrees.

## 2021-06-18 NOTE — ED PROVIDER NOTE - CLINICAL SUMMARY MEDICAL DECISION MAKING FREE TEXT BOX
54 y/o M h/o HTN (no meds) pw  4-5 episodes NBNB vomiting, chills accompanied with epigastric discomfort starting this morning. Denies fever, headache, chest pain, shortness of breath, diarrhea, dysuria, weakness, numbness, tingling. Took Advil at 2pm without relief. Had COVID vac x2 last one in April.   Denies smoking/ETOH. No surgical hx  Plan: Will obtain basic labs with Trop, check EKG, CXR, RVp with COVID, give Pepcid/Zofran, fluids and reassess 56 y/o M h/o HTN (no meds) pw  4-5 episodes NBNB vomiting, chills accompanied with epigastric discomfort starting this morning. Denies fever, headache, chest pain, shortness of breath, diarrhea, dysuria, weakness, numbness, tingling. Took Advil at 2pm without relief. Had COVID vac x2 last one in April.   Denies smoking/ETOH. No surgical hx  Plan: Will obtain basic labs with Trop, check EKG, CXR, RVp with COVID, give Pepcid/Zofran, fluids and reassess  **update: Labs stable. Trop negative. EKG NSR no acute findings. RVp running. Feeling better s/p Pepcid/Zofran- Will DC home on Pepcid/Zofran with Family Practice GI follow up. Strict ED return precautions discussed. Patient understands and agrees.

## 2021-06-18 NOTE — ED PROVIDER NOTE - NSFOLLOWUPCLINICS_GEN_ALL_ED_FT
Family Practice Clinic  Family Medicine  [address] [place] [id number]  Phone: [phone]  Fax:

## 2021-06-18 NOTE — ED PROVIDER NOTE - ATTENDING CONTRIBUTION TO CARE
Eval with RUBEN Kimbrough. 54 y/o M h/o HTN (no meds) pw  4-5 episodes NBNB vomiting, chills accompanied with epigastric discomfort starting this morning. Denies fever, headache, chest pain, shortness of breath, diarrhea, dysuria, weakness, numbness, tingling. Took Advil at 2pm without relief. Had COVID vac x2 last one in April.   Denies smoking/ETOH. No surgical hx  Plan: Will obtain basic labs with Trop, check EKG, CXR, RVp with COVID, give Pepcid/Zofran, fluids and reassess    I performed a face to face bedside interview with patient regarding history of present illness, review of symptoms and past medical history. I completed an independent physical exam.  I have discussed the patient's plan of care with Physician Assistant (PA). I agree with note as stated above, having amended the EMR as needed to reflect my findings.   This includes History of Present Illness, HIV, Past Medical/Surgical/Family/Social History, Allergies and Home Medications, Review of Systems, Physical Exam, and any Progress Notes during the time I functioned as the attending physician for this patient.

## 2021-06-18 NOTE — ED PROVIDER NOTE - TOBACCO USE
Patient is a 85y old  Female who presents with a chief complaint of AMS (10 Apr 2020 14:03)      INTERVAL HPI:  86 yo female with PMHx of HTN, breast ca (s/p L lumpectomy 2018, currently on herceptin infusions q3 week, last 2 weeks ago) p/w AMS that started suddenly today. Patient is confused so daughter-in-law and son were called. Daughter-in-law states that around 7PM earlier today, when she was dropping off some food, when her mother-in-law answered the door she was noticeably disoriented and did not recognize her daughter-in-law. She complains of feeling lightheaded and dizzy, and repeatedly stated "how did you know I was here?" Last normal conversation was at 4:30PM. No recent trauma. Patient was complaining of not feeling well last Friday but attributed it to her chemotherapy. According to daughter, patient was complaining of very vague symptomatology, feeling lightheaded, dizzy with headaches, and nausea and diarrhea. Of note, last echo 3 months ago was normal, and patient has been wearing a pessary for the past few years, complaining of occasional discomfort but no acute changes recently.    4/10/2020: No acute events since admission over night.  Saturating in high 90s on room air.  COVID NEGATIVE.  MR head ordered by neuro. S&S --Regular Diet. K repleted.  2020: Fevere 101.3F at 1800 on 4/10, already on rocephin.  BCx NGTD.  K repleted.  Saturating well on room air.    OVERNIGHT EVENTS: none    Home Medications:  amLODIPine 5 mg oral tablet: 1 tab(s) orally once a day (10 Apr 2020 02:56)  aspirin 81 mg oral tablet: 1 tab(s) orally once a day (10 Apr 2020 02:56)  atenolol 100 mg oral tablet: 1 tab(s) orally once a day (10 Apr 2020 03:00)  Herceptin: every 3 weeks  (10 Apr 2020 03:00)  hydroCHLOROthiazide 25 mg oral tablet: 1 tab(s) orally once a day (10 Apr 2020 02:56)  lisinopril 40 mg oral tablet: 1 tab(s) orally once a day (10 Apr 2020 03:00)  potassium chloride 10 mEq oral capsule, extended release: orally once a day (10 Apr 2020 03:00)      MEDICATIONS  (STANDING):  amLODIPine   Tablet 5 milliGRAM(s) Oral daily  aspirin enteric coated 81 milliGRAM(s) Oral daily  ATENolol  Tablet 100 milliGRAM(s) Oral daily  cefTRIAXone   IVPB 1000 milliGRAM(s) IV Intermittent every 24 hours  enoxaparin Injectable 40 milliGRAM(s) SubCutaneous two times a day  hydrochlorothiazide 25 milliGRAM(s) Oral daily  lisinopril 40 milliGRAM(s) Oral daily  potassium chloride    Tablet ER 40 milliEquivalent(s) Oral every 4 hours    MEDICATIONS  (PRN):  acetaminophen   Tablet .. 650 milliGRAM(s) Oral every 4 hours PRN Temp greater or equal to 38.5C (101.3F)      Allergies    No Known Allergies    Intolerances        REVIEW OF SYSTEMS: i feel fine   CONSTITUTIONAL: No fever, No chills, No fatigue, admits to myalgia, No Body ache, No Weakness  EYES: No eye pain,  No visual disturbances, No discharge, No Redness  ENMT: No ear pain, No nose bleed, No vertigo; No sinus or throat pain, No Congestion  NECK: No pain, No stiffness  RESPIRATORY: no cough, No wheezing, No hemoptysis, no shortness of breath  CARDIOVASCULAR: No chest pain, admits to palpitations  GASTROINTESTINAL: No abdominal or epigastric pain. No nausea, No vomiting, No diarrhea or constipation; [  ] BM  GENITOURINARY: No dysuria, No frequency, No urgency, No hematuria or incontinence  NEUROLOGICAL: No headaches, No dizziness, No numbness, No tingling, No tremors, No weakness  EXT: No Swelling, No Pain, No Edema  SKIN: [ X ] No itching, burning, rashes, or lesions   MUSCULOSKELETAL: No joint pain or swelling; No muscle pain, No back pain, No extremity pain  PSYCHIATRIC: No depression, anxiety, mood swings or difficulty sleeping at night  PAIN SCALE: [X  ] None  [  ] Other-  ROS Unable to obtain due to: [  ] Dementia  [  ] Lethargy  [  ] Sedated  [  ]  non verbal [X ] AMS  REST OF REVIEW OF SYSTEMS: [ x ] Normal    Vital Signs Last 24 Hrs  T(C): 36.9 (2020 05:14), Max: 38.5 (10 Apr 2020 18:03)  T(F): 98.4 (2020 05:14), Max: 101.3 (10 Apr 2020 18:03)  HR: 66 (2020 05:14) (60 - 86)  BP: 116/69 (2020 05:14) (116/69 - 188/74)  BP(mean): --  RR: 18 (2020 05:14) (18 - 19)  SpO2: 94% (2020 05:14) (94% - 96%)    Finger Stick        04-10 @ 07:01  -  - @ 07:00  --------------------------------------------------------  IN: 300 mL / OUT: 0 mL / NET: 300 mL      PHYSICAL EXAM: No Dysarthria   GENERAL:  [ X ] NAD, [X  ] Well appearing, [  ] Agitated, [  ] Drowsy, [  ] Lethargy, [  ] Confused   HEAD:  [X  ] Normal, [  ] Other  EYES:  [ X ] EOMI, [X  ] PERRLA, [X  ] Conjunctiva and sclera clear normal, [  ] Other, [  ] Pallor, [  ] Discharge  ENMT:  [ X ] Normal, [ X ] Moist mucous membranes, [ X ] Good dentition, [  ] No thrush  NECK:  [ X ] Supple, [X  ] No JVD, [  ] Normal thyroid, [  ] Lymphadenopathy, [  ] Other  CHEST/LUNG:  [X  ] Clear to auscultation bilaterally, [ x ] Breath Sounds equal B/L decreased, [X  ] No rales, [ X ] No rhonchi, [X  ] No wheezing  HEART:  [ X ] Regular rate and rhythm, [  ] Tachycardia, [  ] Bradycardia, [  ] Irregular, [X  ] No murmurs, No rubs, No gallops, [  ] PPM in place (Mfr:  )  ABDOMEN:  [ X ] Soft, [ X ] Nontender, [ X ] Nondistended, [X  ] No mass, [X  ] Bowel sounds present, [x  ] Obese  NERVOUS SYSTEM:  [ X ] Alert & Oriented x3, [X  ] Nonfocal, [  ] Confusion, [  ] Encephalopathic, [  ] Sedated, [  ] Unable to assess, [  ] Other-  EXTREMITIES:  [ X ] 2+ Peripheral Pulses, No clubbing, No cyanosis,  [  ] edema B/L lower EXT, [  ] PVD stasis skin changes B/L lower EXT  LYMPH:  No lymphadenopathy noted  SKIN:  [X  ] No rashes or lesions, [  ] Pressure ulcers, [  ] Ecchymosis, [  ] Skin tears, [  ] Other    DIET: DASH    LABS:                        12.0   8.53  )-----------( 150      ( 2020 06:07 )             34.3     2020 06:07    136    |  101    |  14     ----------------------------<  96     3.3     |  29     |  0.81     Ca    8.6        2020 06:07  Mg     2.5       2020 06:07    TPro  7.6    /  Alb  3.2    /  TBili  0.6    /  DBili  x      /  AST  19     /  ALT  14     /  AlkPhos  72     10 Apr 2020 12:40      Urinalysis Basic - ( 2020 22:04 )    Color: Yellow / Appearance: Slightly Turbid / S.005 / pH: x  Gluc: x / Ketone: Small  / Bili: Negative / Urobili: Negative   Blood: x / Protein: 30 mg/dL / Nitrite: Negative   Leuk Esterase: Trace / RBC: x / WBC 3-5   Sq Epi: x / Non Sq Epi: Few / Bacteria: Moderate        Culture Results:   No growth to date. (04-10 @ 00:22)  Culture Results:   No growth to date. (04-10 @ 00:22)      culture blood  -- .Blood Blood-Venous 04-10 @ 00:22    culture urine  --  04-10 @ 00:22      CARDIAC MARKERS ( 2020 21:56 )  <.015 ng/mL / x     / x     / x     / x              Culture - Blood (collected 10 Apr 2020 00:22)  Source: .Blood Blood-Venous  Preliminary Report (2020 01:03):    No growth to date.    Culture - Blood (collected 10 Apr 2020 00:22)  Source: .Blood Blood-Venous  Preliminary Report (2020 01:03):    No growth to date.         Anemia Panel:      Thyroid Panel:  Thyroid Stimulating Hormone, Serum: 1.16 uIU/mL (20 @ 06:07)        Lipase, Serum: 215 U/L (20 @ 21:56)          RADIOLOGY & ADDITIONAL TESTS:    HEALTH ISSUES - PROBLEM Dx:  Advanced directives, counseling/discussion: Advanced directives, counseling/discussion  Need for prophylactic measure: Need for prophylactic measure  Metabolic encephalopathy: Metabolic encephalopathy  Hypokalemia: Hypokalemia  HTN (hypertension): HTN (hypertension)  Breast cancer: Breast cancer        Consultant(s) Notes Reviewed:  [X  ] YES     Care Discussed with [ x ] Consultants, [ X ] Patient, [  ] Family, [X  ] , [ X ] Social Service, [X  ] RN, [  ] Physical Therapy  DVT PPX: [X  ] Lovenox, [  ] S C Heparin, [  ] Coumadin, [  ] Xarelto, [  ] Eliquis, [  ] Pradaxa, [  ] IV Heparin drip, [  ] SCD, [  ] Contraindication secondary to GI Bleed, [  ] Ambulation  Advanced Directive: [X  ] None, [  ] DNR/DNI Patient is a 85y old  Female who presents with a chief complaint of AMS (10 Apr 2020 14:03)      INTERVAL HPI:  86 yo female with PMHx of HTN, breast ca (s/p L lumpectomy 2018, currently on herceptin infusions q3 week, last 2 weeks ago) p/w AMS that started suddenly today. Patient is confused so daughter-in-law and son were called. Daughter-in-law states that around 7PM earlier today, when she was dropping off some food, when her mother-in-law answered the door she was noticeably disoriented and did not recognize her daughter-in-law. She complains of feeling lightheaded and dizzy, and repeatedly stated "how did you know I was here?" Last normal conversation was at 4:30PM. No recent trauma. Patient was complaining of not feeling well last Friday but attributed it to her chemotherapy. According to daughter, patient was complaining of very vague symptomatology, feeling lightheaded, dizzy with headaches, and nausea and diarrhea. Of note, last echo 3 months ago was normal, and patient has been wearing a pessary for the past few years, complaining of occasional discomfort but no acute changes recently.    4/10/2020: No acute events since admission over night.  Saturating in high 90s on room air.  COVID NEGATIVE.  MR head ordered by neuro. S&S --Regular Diet. K repleted.  2020: Fever 101.3F at 1800 on 4/10, already on rocephin.  BCx NGTD.  K repleted.  Saturating well on room air. Will get repeat COVID testing in setting of fever and possible exposure.    OVERNIGHT EVENTS: none    Home Medications:  amLODIPine 5 mg oral tablet: 1 tab(s) orally once a day (10 Apr 2020 02:56)  aspirin 81 mg oral tablet: 1 tab(s) orally once a day (10 Apr 2020 02:56)  atenolol 100 mg oral tablet: 1 tab(s) orally once a day (10 Apr 2020 03:00)  Herceptin: every 3 weeks  (10 Apr 2020 03:00)  hydroCHLOROthiazide 25 mg oral tablet: 1 tab(s) orally once a day (10 Apr 2020 02:56)  lisinopril 40 mg oral tablet: 1 tab(s) orally once a day (10 Apr 2020 03:00)  potassium chloride 10 mEq oral capsule, extended release: orally once a day (10 Apr 2020 03:00)      MEDICATIONS  (STANDING):  amLODIPine   Tablet 5 milliGRAM(s) Oral daily  aspirin enteric coated 81 milliGRAM(s) Oral daily  ATENolol  Tablet 100 milliGRAM(s) Oral daily  cefTRIAXone   IVPB 1000 milliGRAM(s) IV Intermittent every 24 hours  enoxaparin Injectable 40 milliGRAM(s) SubCutaneous two times a day  hydrochlorothiazide 25 milliGRAM(s) Oral daily  lisinopril 40 milliGRAM(s) Oral daily  potassium chloride    Tablet ER 40 milliEquivalent(s) Oral every 4 hours    MEDICATIONS  (PRN):  acetaminophen   Tablet .. 650 milliGRAM(s) Oral every 4 hours PRN Temp greater or equal to 38.5C (101.3F)      Allergies    No Known Allergies    Intolerances        REVIEW OF SYSTEMS: i feel fine   CONSTITUTIONAL: No fever, No chills, No fatigue, admits to myalgia, No Body ache, No Weakness  EYES: No eye pain,  No visual disturbances, No discharge, No Redness  ENMT: No ear pain, No nose bleed, No vertigo; No sinus or throat pain, No Congestion  NECK: No pain, No stiffness  RESPIRATORY: no cough, No wheezing, No hemoptysis, no shortness of breath  CARDIOVASCULAR: No chest pain, admits to palpitations  GASTROINTESTINAL: No abdominal or epigastric pain. No nausea, No vomiting, No diarrhea or constipation; [  ] BM  GENITOURINARY: No dysuria, No frequency, No urgency, No hematuria or incontinence  NEUROLOGICAL: No headaches, No dizziness, No numbness, No tingling, No tremors, No weakness  EXT: No Swelling, No Pain, No Edema  SKIN: [ X ] No itching, burning, rashes, or lesions   MUSCULOSKELETAL: No joint pain or swelling; No muscle pain, No back pain, No extremity pain  PSYCHIATRIC: No depression, anxiety, mood swings or difficulty sleeping at night  PAIN SCALE: [X  ] None  [  ] Other-  ROS Unable to obtain due to: [  ] Dementia  [  ] Lethargy  [  ] Sedated  [  ]  non verbal [X ] AMS  REST OF REVIEW OF SYSTEMS: [ x ] Normal    Vital Signs Last 24 Hrs  T(C): 36.9 (2020 05:14), Max: 38.5 (10 Apr 2020 18:03)  T(F): 98.4 (2020 05:14), Max: 101.3 (10 Apr 2020 18:03)  HR: 66 (2020 05:14) (60 - 86)  BP: 116/69 (2020 05:14) (116/69 - 188/74)  BP(mean): --  RR: 18 (2020 05:14) (18 - 19)  SpO2: 94% (2020 05:14) (94% - 96%)    Finger Stick        04-10 @ 07:01  -  04- @ 07:00  --------------------------------------------------------  IN: 300 mL / OUT: 0 mL / NET: 300 mL      PHYSICAL EXAM: No Dysarthria   GENERAL:  [ X ] NAD, [X  ] Well appearing, [  ] Agitated, [  ] Drowsy, [  ] Lethargy, [  ] Confused   HEAD:  [X  ] Normal, [  ] Other  EYES:  [ X ] EOMI, [X  ] PERRLA, [X  ] Conjunctiva and sclera clear normal, [  ] Other, [  ] Pallor, [  ] Discharge  ENMT:  [ X ] Normal, [ X ] Moist mucous membranes, [ X ] Good dentition, [  ] No thrush  NECK:  [ X ] Supple, [X  ] No JVD, [  ] Normal thyroid, [  ] Lymphadenopathy, [  ] Other  CHEST/LUNG:  [X  ] Clear to auscultation bilaterally, [ x ] Breath Sounds equal B/L decreased, [X  ] No rales, [ X ] No rhonchi, [X  ] No wheezing  HEART:  [ X ] Regular rate and rhythm, [  ] Tachycardia, [  ] Bradycardia, [  ] Irregular, [X  ] No murmurs, No rubs, No gallops, [  ] PPM in place (Mfr:  )  ABDOMEN:  [ X ] Soft, [ X ] Nontender, [ X ] Nondistended, [X  ] No mass, [X  ] Bowel sounds present, [x  ] Obese  NERVOUS SYSTEM:  [ X ] Alert & Oriented x3, [X  ] Nonfocal, [  ] Confusion, [  ] Encephalopathic, [  ] Sedated, [  ] Unable to assess, [  ] Other-  EXTREMITIES:  [ X ] 2+ Peripheral Pulses, No clubbing, No cyanosis,  [  ] edema B/L lower EXT, [  ] PVD stasis skin changes B/L lower EXT  LYMPH:  No lymphadenopathy noted  SKIN:  [X  ] No rashes or lesions, [  ] Pressure ulcers, [  ] Ecchymosis, [  ] Skin tears, [  ] Other    DIET: DASH    LABS:                        12.0   8.53  )-----------( 150      ( 2020 06:07 )             34.3     2020 06:07    136    |  101    |  14     ----------------------------<  96     3.3     |  29     |  0.81     Ca    8.6        2020 06:07  Mg     2.5       2020 06:07    TPro  7.6    /  Alb  3.2    /  TBili  0.6    /  DBili  x      /  AST  19     /  ALT  14     /  AlkPhos  72     10 Apr 2020 12:40      Urinalysis Basic - ( 2020 22:04 )    Color: Yellow / Appearance: Slightly Turbid / S.005 / pH: x  Gluc: x / Ketone: Small  / Bili: Negative / Urobili: Negative   Blood: x / Protein: 30 mg/dL / Nitrite: Negative   Leuk Esterase: Trace / RBC: x / WBC 3-5   Sq Epi: x / Non Sq Epi: Few / Bacteria: Moderate        Culture Results:   No growth to date. (04-10 @ 00:22)  Culture Results:   No growth to date. (04-10 @ 00:22)      culture blood  -- .Blood Blood-Venous 04-10 @ 00:22    culture urine  --  04-10 @ 00:22      CARDIAC MARKERS ( 2020 21:56 )  <.015 ng/mL / x     / x     / x     / x              Culture - Blood (collected 10 Apr 2020 00:22)  Source: .Blood Blood-Venous  Preliminary Report (2020 01:03):    No growth to date.    Culture - Blood (collected 10 Apr 2020 00:22)  Source: .Blood Blood-Venous  Preliminary Report (2020 01:03):    No growth to date.         Anemia Panel:      Thyroid Panel:  Thyroid Stimulating Hormone, Serum: 1.16 uIU/mL (20 @ 06:07)        Lipase, Serum: 215 U/L (20 @ 21:56)          RADIOLOGY & ADDITIONAL TESTS:    HEALTH ISSUES - PROBLEM Dx:  Advanced directives, counseling/discussion: Advanced directives, counseling/discussion  Need for prophylactic measure: Need for prophylactic measure  Metabolic encephalopathy: Metabolic encephalopathy  Hypokalemia: Hypokalemia  HTN (hypertension): HTN (hypertension)  Breast cancer: Breast cancer        Consultant(s) Notes Reviewed:  [X  ] YES     Care Discussed with [ x ] Consultants, [ X ] Patient, [  ] Family, [X  ] , [ X ] Social Service, [X  ] RN, [  ] Physical Therapy  DVT PPX: [X  ] Lovenox, [  ] S C Heparin, [  ] Coumadin, [  ] Xarelto, [  ] Eliquis, [  ] Pradaxa, [  ] IV Heparin drip, [  ] SCD, [  ] Contraindication secondary to GI Bleed, [  ] Ambulation  Advanced Directive: [X  ] None, [  ] DNR/DNI Patient is a 85y old  Female who presents with a chief complaint of AMS (10 Apr 2020 14:03)      INTERVAL HPI:  86 yo female with PMHx of HTN, breast ca (s/p L lumpectomy 2018, currently on herceptin infusions q3 week, last 2 weeks ago) p/w AMS that started suddenly today. Patient is confused so daughter-in-law and son were called. Daughter-in-law states that around 7PM earlier today, when she was dropping off some food, when her mother-in-law answered the door she was noticeably disoriented and did not recognize her daughter-in-law. She complains of feeling lightheaded and dizzy, and repeatedly stated "how did you know I was here?" Last normal conversation was at 4:30PM. No recent trauma. Patient was complaining of not feeling well last Friday but attributed it to her chemotherapy. According to daughter, patient was complaining of very vague symptomatology, feeling lightheaded, dizzy with headaches, and nausea and diarrhea. Of note, last echo 3 months ago was normal, and patient has been wearing a pessary for the past few years, complaining of occasional discomfort but no acute changes recently.    4/10/2020: No acute events since admission over night.  Saturating in high 90s on room air.  COVID NEGATIVE.  MR head ordered by neuro. S&S --Regular Diet. K repleted.  2020: Fever 101.3F at 1800 on 4/10, already on rocephin.  BCx NGTD.  K repleted.  Saturating well on room air. Will get repeat COVID testing in setting of fever and possible exposure. 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  Cardio and ID consulted.  Started on atorvastatin.  Echo ordered.  Allow for permissive HTN.    OVERNIGHT EVENTS: none    Home Medications:  amLODIPine 5 mg oral tablet: 1 tab(s) orally once a day (10 Apr 2020 02:56)  aspirin 81 mg oral tablet: 1 tab(s) orally once a day (10 Apr 2020 02:56)  atenolol 100 mg oral tablet: 1 tab(s) orally once a day (10 Apr 2020 03:00)  Herceptin: every 3 weeks  (10 Apr 2020 03:00)  hydroCHLOROthiazide 25 mg oral tablet: 1 tab(s) orally once a day (10 Apr 2020 02:56)  lisinopril 40 mg oral tablet: 1 tab(s) orally once a day (10 Apr 2020 03:00)  potassium chloride 10 mEq oral capsule, extended release: orally once a day (10 Apr 2020 03:00)      MEDICATIONS  (STANDING):  amLODIPine   Tablet 5 milliGRAM(s) Oral daily  aspirin enteric coated 81 milliGRAM(s) Oral daily  ATENolol  Tablet 100 milliGRAM(s) Oral daily  cefTRIAXone   IVPB 1000 milliGRAM(s) IV Intermittent every 24 hours  enoxaparin Injectable 40 milliGRAM(s) SubCutaneous two times a day  hydrochlorothiazide 25 milliGRAM(s) Oral daily  lisinopril 40 milliGRAM(s) Oral daily  potassium chloride    Tablet ER 40 milliEquivalent(s) Oral every 4 hours    MEDICATIONS  (PRN):  acetaminophen   Tablet .. 650 milliGRAM(s) Oral every 4 hours PRN Temp greater or equal to 38.5C (101.3F)      Allergies    No Known Allergies    Intolerances        REVIEW OF SYSTEMS: i feel fine   CONSTITUTIONAL: No fever, No chills, No fatigue, admits to myalgia, No Body ache, No Weakness  EYES: No eye pain,  No visual disturbances, No discharge, No Redness  ENMT: No ear pain, No nose bleed, No vertigo; No sinus or throat pain, No Congestion  NECK: No pain, No stiffness  RESPIRATORY: no cough, No wheezing, No hemoptysis, no shortness of breath  CARDIOVASCULAR: No chest pain, admits to palpitations  GASTROINTESTINAL: No abdominal or epigastric pain. No nausea, No vomiting, No diarrhea or constipation; [  ] BM  GENITOURINARY: No dysuria, No frequency, No urgency, No hematuria or incontinence  NEUROLOGICAL: No headaches, No dizziness, No numbness, No tingling, No tremors, No weakness  EXT: No Swelling, No Pain, No Edema  SKIN: [ X ] No itching, burning, rashes, or lesions   MUSCULOSKELETAL: No joint pain or swelling; No muscle pain, No back pain, No extremity pain  PSYCHIATRIC: No depression, anxiety, mood swings or difficulty sleeping at night  PAIN SCALE: [X  ] None  [  ] Other-  ROS Unable to obtain due to: [  ] Dementia  [  ] Lethargy  [  ] Sedated  [  ]  non verbal [X ] AMS  REST OF REVIEW OF SYSTEMS: [ x ] Normal    Vital Signs Last 24 Hrs  T(C): 36.9 (2020 05:14), Max: 38.5 (10 Apr 2020 18:03)  T(F): 98.4 (2020 05:14), Max: 101.3 (10 Apr 2020 18:03)  HR: 66 (2020 05:14) (60 - 86)  BP: 116/69 (2020 05:14) (116/69 - 188/74)  BP(mean): --  RR: 18 (2020 05:14) (18 - 19)  SpO2: 94% (2020 05:14) (94% - 96%)    Finger Stick        -10 @ 07:01  -  04-11 @ 07:00  --------------------------------------------------------  IN: 300 mL / OUT: 0 mL / NET: 300 mL      PHYSICAL EXAM: No Dysarthria   GENERAL:  [ X ] NAD, [X  ] Well appearing, [  ] Agitated, [  ] Drowsy, [  ] Lethargy, [  ] Confused   HEAD:  [X  ] Normal, [  ] Other  EYES:  [ X ] EOMI, [X  ] PERRLA, [X  ] Conjunctiva and sclera clear normal, [  ] Other, [  ] Pallor, [  ] Discharge  ENMT:  [ X ] Normal, [ X ] Moist mucous membranes, [ X ] Good dentition, [  ] No thrush  NECK:  [ X ] Supple, [X  ] No JVD, [  ] Normal thyroid, [  ] Lymphadenopathy, [  ] Other  CHEST/LUNG:  [X  ] Clear to auscultation bilaterally, [ x ] Breath Sounds equal B/L decreased, [X  ] No rales, [ X ] No rhonchi, [X  ] No wheezing  HEART:  [ X ] Regular rate and rhythm, [  ] Tachycardia, [  ] Bradycardia, [  ] Irregular, [X  ] No murmurs, No rubs, No gallops, [  ] PPM in place (Mfr:  )  ABDOMEN:  [ X ] Soft, [ X ] Nontender, [ X ] Nondistended, [X  ] No mass, [X  ] Bowel sounds present, [x  ] Obese  NERVOUS SYSTEM:  [ X ] Alert & Oriented x3, [X  ] Nonfocal, [  ] Confusion, [  ] Encephalopathic, [  ] Sedated, [  ] Unable to assess, [  ] Other-  EXTREMITIES:  [ X ] 2+ Peripheral Pulses, No clubbing, No cyanosis,  [  ] edema B/L lower EXT, [  ] PVD stasis skin changes B/L lower EXT  LYMPH:  No lymphadenopathy noted  SKIN:  [X  ] No rashes or lesions, [  ] Pressure ulcers, [  ] Ecchymosis, [  ] Skin tears, [  ] Other    DIET: DASH    LABS:                        12.0   8.53  )-----------( 150      ( 2020 06:07 )             34.3     2020 06:07    136    |  101    |  14     ----------------------------<  96     3.3     |  29     |  0.81     Ca    8.6        2020 06:07  Mg     2.5       2020 06:07    TPro  7.6    /  Alb  3.2    /  TBili  0.6    /  DBili  x      /  AST  19     /  ALT  14     /  AlkPhos  72     10 Apr 2020 12:40      Urinalysis Basic - ( 2020 22:04 )    Color: Yellow / Appearance: Slightly Turbid / S.005 / pH: x  Gluc: x / Ketone: Small  / Bili: Negative / Urobili: Negative   Blood: x / Protein: 30 mg/dL / Nitrite: Negative   Leuk Esterase: Trace / RBC: x / WBC 3-5   Sq Epi: x / Non Sq Epi: Few / Bacteria: Moderate        Culture Results:   No growth to date. (04-10 @ 00:22)  Culture Results:   No growth to date. (04-10 @ 00:22)      culture blood  -- .Blood Blood-Venous 04-10 @ 00:22    culture urine  --  04-10 @ 00:22      CARDIAC MARKERS ( 2020 21:56 )  <.015 ng/mL / x     / x     / x     / x              Culture - Blood (collected 10 Apr 2020 00:22)  Source: .Blood Blood-Venous  Preliminary Report (2020 01:03):    No growth to date.    Culture - Blood (collected 10 Apr 2020 00:22)  Source: .Blood Blood-Venous  Preliminary Report (2020 01:03):    No growth to date.         Anemia Panel:      Thyroid Panel:  Thyroid Stimulating Hormone, Serum: 1.16 uIU/mL (20 @ 06:07)        Lipase, Serum: 215 U/L (20 @ 21:56)          RADIOLOGY & ADDITIONAL TESTS:    HEALTH ISSUES - PROBLEM Dx:  Advanced directives, counseling/discussion: Advanced directives, counseling/discussion  Need for prophylactic measure: Need for prophylactic measure  Metabolic encephalopathy: Metabolic encephalopathy  Hypokalemia: Hypokalemia  HTN (hypertension): HTN (hypertension)  Breast cancer: Breast cancer        Consultant(s) Notes Reviewed:  [X  ] YES     Care Discussed with [ x ] Consultants, [ X ] Patient, [  ] Family, [X  ] , [ X ] Social Service, [X  ] RN, [  ] Physical Therapy  DVT PPX: [X  ] Lovenox, [  ] S C Heparin, [  ] Coumadin, [  ] Xarelto, [  ] Eliquis, [  ] Pradaxa, [  ] IV Heparin drip, [  ] SCD, [  ] Contraindication secondary to GI Bleed, [  ] Ambulation  Advanced Directive: [X  ] None, [  ] DNR/DNI Never smoker Patient is a 85y old  Female who presents with a chief complaint of AMS (10 Apr 2020 14:03)      INTERVAL HPI:  86 yo female with PMHx of HTN, breast ca (s/p L lumpectomy 2018, currently on herceptin infusions q3 week, last 2 weeks ago) p/w AMS that started suddenly today. Patient is confused so daughter-in-law and son were called. Daughter-in-law states that around 7PM earlier today, when she was dropping off some food, when her mother-in-law answered the door she was noticeably disoriented and did not recognize her daughter-in-law. She complains of feeling lightheaded and dizzy, and repeatedly stated "how did you know I was here?" Last normal conversation was at 4:30PM. No recent trauma. Patient was complaining of not feeling well last Friday but attributed it to her chemotherapy. According to daughter, patient was complaining of very vague symptomatology, feeling lightheaded, dizzy with headaches, and nausea and diarrhea. Of note, last echo 3 months ago was normal, and patient has been wearing a pessary for the past few years, complaining of occasional discomfort but no acute changes recently.    4/10/2020: No acute events since admission over night.  Saturating in high 90s on room air.  COVID NEGATIVE.  MR head ordered by neuro. S&S --Regular Diet. K repleted.  2020: Fever 101.3F at 1800 on 4/10, already on rocephin.  BCx NGTD.  K repleted.  Saturating well on room air. Will get repeat COVID testing in setting of fever and possible exposure. 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  Cardio Dr Tong  and ID Dr Nair consulted.  Started on atorvastatin.  Echo & CD ordered.   Allow for permissive HTN.    OVERNIGHT EVENTS: none    Home Medications:  amLODIPine 5 mg oral tablet: 1 tab(s) orally once a day (10 Apr 2020 02:56)  aspirin 81 mg oral tablet: 1 tab(s) orally once a day (10 Apr 2020 02:56)  atenolol 100 mg oral tablet: 1 tab(s) orally once a day (10 Apr 2020 03:00)  Herceptin: every 3 weeks  (10 Apr 2020 03:00)  hydroCHLOROthiazide 25 mg oral tablet: 1 tab(s) orally once a day (10 Apr 2020 02:56)  lisinopril 40 mg oral tablet: 1 tab(s) orally once a day (10 Apr 2020 03:00)  potassium chloride 10 mEq oral capsule, extended release: orally once a day (10 Apr 2020 03:00)      MEDICATIONS  (STANDING):  amLODIPine   Tablet 5 milliGRAM(s) Oral daily  aspirin enteric coated 81 milliGRAM(s) Oral daily  ATENolol  Tablet 100 milliGRAM(s) Oral daily  cefTRIAXone   IVPB 1000 milliGRAM(s) IV Intermittent every 24 hours  enoxaparin Injectable 40 milliGRAM(s) SubCutaneous two times a day  hydrochlorothiazide 25 milliGRAM(s) Oral daily  lisinopril 40 milliGRAM(s) Oral daily  potassium chloride    Tablet ER 40 milliEquivalent(s) Oral every 4 hours    MEDICATIONS  (PRN):  acetaminophen   Tablet .. 650 milliGRAM(s) Oral every 4 hours PRN Temp greater or equal to 38.5C (101.3F)      Allergies    No Known Allergies    Intolerances        REVIEW OF SYSTEMS: i feel fine can I go home ?   CONSTITUTIONAL: No fever, No chills, No fatigue, admits to myalgia, No Body ache, No Weakness  EYES: No eye pain,  No visual disturbances, No discharge, No Redness  ENMT: No ear pain, No nose bleed, No vertigo; No sinus or throat pain, No Congestion  NECK: No pain, No stiffness  RESPIRATORY: no cough, No wheezing, No hemoptysis, no shortness of breath  CARDIOVASCULAR: No chest pain, admits to palpitations  GASTROINTESTINAL: No abdominal or epigastric pain. No nausea, No vomiting, No diarrhea or constipation; [  ] BM  GENITOURINARY: No dysuria, No frequency, No urgency, No hematuria or incontinence  NEUROLOGICAL: No headaches, No dizziness, No numbness, No tingling, No tremors, No weakness  EXT: No Swelling, No Pain, No Edema  SKIN: [ X ] No itching, burning, rashes, or lesions   MUSCULOSKELETAL: No joint pain or swelling; No muscle pain, No back pain, No extremity pain  PSYCHIATRIC: No depression, anxiety, mood swings or difficulty sleeping at night  PAIN SCALE: [X  ] None  [  ] Other-  ROS Unable to obtain due to: [  ] Dementia  [  ] Lethargy  [  ] Sedated  [  ]  non verbal [X ] AMS  REST OF REVIEW OF SYSTEMS: [ x ] Normal    Vital Signs Last 24 Hrs  T(C): 36.9 (2020 05:14), Max: 38.5 (10 Apr 2020 18:03)  T(F): 98.4 (2020 05:14), Max: 101.3 (10 Apr 2020 18:03)  HR: 66 (2020 05:14) (60 - 86)  BP: 116/69 (2020 05:14) (116/69 - 188/74)  BP(mean): --  RR: 18 (2020 05:14) (18 - 19)  SpO2: 94% (2020 05:14) (94% - 96%)    Finger Stick        04-10 @ 07:01  -  04-11 @ 07:00  --------------------------------------------------------  IN: 300 mL / OUT: 0 mL / NET: 300 mL      PHYSICAL EXAM: No Dysarthria   GENERAL:  [ X ] NAD, [X  ] Well appearing, [  ] Agitated, [  ] Drowsy, [  ] Lethargy, [  ] Confused   HEAD:  [X  ] Normal, [  ] Other  EYES:  [ X ] EOMI, [X  ] PERRLA, [X  ] Conjunctiva and sclera clear normal, [  ] Other, [  ] Pallor, [  ] Discharge  ENMT:  [ X ] Normal, [ X ] Moist mucous membranes, [ X ] Good dentition, [  ] No thrush  NECK:  [ X ] Supple, [X  ] No JVD, [ x ] Normal thyroid, [  ] Lymphadenopathy, [  ] Other  CHEST/LUNG:  [X  ] Clear to auscultation bilaterally, [ x ] Breath Sounds equal B/L decreased, [X  ] No rales, [ X ] No rhonchi, [X  ] No wheezing  HEART:  [ X ] Regular rate and rhythm, [  ] Tachycardia, [  ] Bradycardia, [  ] Irregular, [X  ] No murmurs, No rubs, No gallops, [  ] PPM in place (Mfr:  )  ABDOMEN:  [ X ] Soft, [ X ] Nontender, [ X ] Nondistended, [X  ] No mass, [X  ] Bowel sounds present, [x  ] Obese  NERVOUS SYSTEM:  [ X ] Alert & Oriented x3, [X  ] Nonfocal, [  ] Confusion, [  ] Encephalopathic, [  ] Sedated, [  ] Unable to assess, [  ] Other-  EXTREMITIES:  [ X ] 2+ Peripheral Pulses, No clubbing, No cyanosis,  [  ] edema B/L lower EXT, [  ] PVD stasis skin changes B/L lower EXT  LYMPH:  No lymphadenopathy noted  SKIN:  [X  ] No rashes or lesions, [  ] Pressure ulcers, [  ] Ecchymosis, [  ] Skin tears, [  ] Other    DIET: DASH    LABS:                        12.0   8.53  )-----------( 150      ( 2020 06:07 )             34.3     2020 06:07    136    |  101    |  14     ----------------------------<  96     3.3     |  29     |  0.81     Ca    8.6        2020 06:07  Mg     2.5       2020 06:07    TPro  7.6    /  Alb  3.2    /  TBili  0.6    /  DBili  x      /  AST  19     /  ALT  14     /  AlkPhos  72     10 Apr 2020 12:40      Urinalysis Basic - ( 2020 22:04 )    Color: Yellow / Appearance: Slightly Turbid / S.005 / pH: x  Gluc: x / Ketone: Small  / Bili: Negative / Urobili: Negative   Blood: x / Protein: 30 mg/dL / Nitrite: Negative   Leuk Esterase: Trace / RBC: x / WBC 3-5   Sq Epi: x / Non Sq Epi: Few / Bacteria: Moderate        Culture Results:   No growth to date. (04-10 @ 00:22)  Culture Results:   No growth to date. (04-10 @ 00:22)      culture blood  -- .Blood Blood-Venous 04-10 @ 00:22    culture urine  --  04-10 @ 00:22      CARDIAC MARKERS ( 2020 21:56 )  <.015 ng/mL / x     / x     / x     / x              Culture - Blood (collected 10 Apr 2020 00:22)  Source: .Blood Blood-Venous  Preliminary Report (2020 01:03):    No growth to date.    Culture - Blood (collected 10 Apr 2020 00:22)  Source: .Blood Blood-Venous  Preliminary Report (2020 01:03):    No growth to date.         Anemia Panel:      Thyroid Panel:  Thyroid Stimulating Hormone, Serum: 1.16 uIU/mL (20 @ 06:07)        Lipase, Serum: 215 U/L (20 @ 21:56)          RADIOLOGY & ADDITIONAL TESTS:    HEALTH ISSUES - PROBLEM Dx:  Advanced directives, counseling/discussion: Advanced directives, counseling/discussion  Need for prophylactic measure: Need for prophylactic measure  Metabolic encephalopathy: Metabolic encephalopathy  Hypokalemia: Hypokalemia  HTN (hypertension): HTN (hypertension)  Breast cancer: Breast cancer        Consultant(s) Notes Reviewed:  [X  ] YES     Care Discussed with [ x ] Consultants, [ X ] Patient, [  ] Family, [X  ] , [ X ] Social Service, [X  ] RN, [  ] Physical Therapy  DVT PPX: [X  ] Lovenox, [  ] S C Heparin, [  ] Coumadin, [  ] Xarelto, [  ] Eliquis, [  ] Pradaxa, [  ] IV Heparin drip, [  ] SCD, [  ] Contraindication secondary to GI Bleed, [  ] Ambulation  Advanced Directive: [X  ] None, [  ] DNR/DNI Patient is a 85y old  Female who presents with a chief complaint of AMS (10 Apr 2020 14:03)      INTERVAL HPI:  86 yo female with PMHx of HTN, breast ca (s/p L lumpectomy 2018, currently on herceptin infusions q3 week, last 2 weeks ago) p/w AMS that started suddenly today. Patient is confused so daughter-in-law and son were called. Daughter-in-law states that around 7PM earlier today, when she was dropping off some food, when her mother-in-law answered the door she was noticeably disoriented and did not recognize her daughter-in-law. She complains of feeling lightheaded and dizzy, and repeatedly stated "how did you know I was here?" Last normal conversation was at 4:30PM. No recent trauma. Patient was complaining of not feeling well last Friday but attributed it to her chemotherapy. According to daughter, patient was complaining of very vague symptomatology, feeling lightheaded, dizzy with headaches, and nausea and diarrhea. Of note, last echo 3 months ago was normal, and patient has been wearing a pessary for the past few years, complaining of occasional discomfort but no acute changes recently.    4/10/2020: No acute events since admission over night.  Saturating in high 90s on room air.  COVID NEGATIVE.  MR head ordered by neuro. S&S --Regular Diet. K repleted.  2020: Fever 101.3F at 1800 on 4/10, already on rocephin.  BCx NGTD.  K repleted.  Saturating well on room air. Will get repeat COVID testing in setting of fever and possible exposure. 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  Cardio Dr Togn  and ID Dr Nair consulted.  Started on atorvastatin.  Echo & CD ordered.   Allow for permissive HTN.    OVERNIGHT EVENTS: none    Home Medications:  amLODIPine 5 mg oral tablet: 1 tab(s) orally once a day (10 Apr 2020 02:56)  aspirin 81 mg oral tablet: 1 tab(s) orally once a day (10 Apr 2020 02:56)  atenolol 100 mg oral tablet: 1 tab(s) orally once a day (10 Apr 2020 03:00)  Herceptin: every 3 weeks  (10 Apr 2020 03:00)  hydroCHLOROthiazide 25 mg oral tablet: 1 tab(s) orally once a day (10 Apr 2020 02:56)  lisinopril 40 mg oral tablet: 1 tab(s) orally once a day (10 Apr 2020 03:00)  potassium chloride 10 mEq oral capsule, extended release: orally once a day (10 Apr 2020 03:00)      MEDICATIONS  (STANDING):  amLODIPine   Tablet 5 milliGRAM(s) Oral daily  aspirin enteric coated 81 milliGRAM(s) Oral daily  ATENolol  Tablet 100 milliGRAM(s) Oral daily  cefTRIAXone   IVPB 1000 milliGRAM(s) IV Intermittent every 24 hours  enoxaparin Injectable 40 milliGRAM(s) SubCutaneous two times a day  hydrochlorothiazide 25 milliGRAM(s) Oral daily  lisinopril 40 milliGRAM(s) Oral daily  potassium chloride    Tablet ER 40 milliEquivalent(s) Oral every 4 hours    MEDICATIONS  (PRN):  acetaminophen   Tablet .. 650 milliGRAM(s) Oral every 4 hours PRN Temp greater or equal to 38.5C (101.3F)      Allergies    No Known Allergies    Intolerances        REVIEW OF SYSTEMS: i feel fine can I go home ?   CONSTITUTIONAL: No fever, No chills, No fatigue, admits to myalgia, No Body ache, No Weakness  EYES: No eye pain,  No visual disturbances, No discharge, No Redness  ENMT: No ear pain, No nose bleed, No vertigo; No sinus or throat pain, No Congestion  NECK: No pain, No stiffness  RESPIRATORY: no cough, No wheezing, No hemoptysis, no shortness of breath  CARDIOVASCULAR: No chest pain, admits to palpitations  GASTROINTESTINAL: No abdominal or epigastric pain. No nausea, No vomiting, No diarrhea or constipation; [  ] BM  GENITOURINARY: No dysuria, No frequency, No urgency, No hematuria or incontinence  NEUROLOGICAL: No headaches, No dizziness, No numbness, No tingling, No tremors, No weakness  EXT: No Swelling, No Pain, No Edema  SKIN: [ X ] No itching, burning, rashes, or lesions   MUSCULOSKELETAL: No joint pain or swelling; No muscle pain, No back pain, No extremity pain  PSYCHIATRIC: No depression, anxiety, mood swings or difficulty sleeping at night  PAIN SCALE: [X  ] None  [  ] Other-  ROS Unable to obtain due to: [  ] Dementia  [  ] Lethargy  [  ] Sedated  [  ]  non verbal [X ] AMS  REST OF REVIEW OF SYSTEMS: [ x ] Normal    Vital Signs Last 24 Hrs  T(C): 36.9 (2020 05:14), Max: 38.5 (10 Apr 2020 18:03)  T(F): 98.4 (2020 05:14), Max: 101.3 (10 Apr 2020 18:03)  HR: 66 (2020 05:14) (60 - 86)  BP: 116/69 (2020 05:14) (116/69 - 188/74)  BP(mean): --  RR: 18 (2020 05:14) (18 - 19)  SpO2: 94% (2020 05:14) (94% - 96%)    Finger Stick        04-10 @ 07:01  -  04-11 @ 07:00  --------------------------------------------------------  IN: 300 mL / OUT: 0 mL / NET: 300 mL      PHYSICAL EXAM: No Dysarthria   GENERAL:  [ X ] NAD, [X  ] Well appearing, [  ] Agitated, [  ] Drowsy, [  ] Lethargy, [  ] Confused   HEAD:  [X  ] Normal, [  ] Other  EYES:  [ X ] EOMI, [X  ] PERRLA, [X  ] Conjunctiva and sclera clear normal, [  ] Other, [  ] Pallor, [  ] Discharge  ENMT:  [ X ] Normal, [ X ] Moist mucous membranes, [ X ] Good dentition, [  ] No thrush  NECK:  [ X ] Supple, [X  ] No JVD, [ x ] Normal thyroid, [  ] Lymphadenopathy, [  ] Other  CHEST/LUNG:  [X  ] Clear to auscultation bilaterally, [ x ] Breath Sounds equal B/L decreased, [X  ] No rales, [ X ] No rhonchi, [X  ] No wheezing  HEART:  [ X ] Regular rate and rhythm, [  ] Tachycardia, [  ] Bradycardia, [  ] Irregular, [X  ] No murmurs, No rubs, No gallops, [  ] PPM in place (Mfr:  )  ABDOMEN:  [ X ] Soft, [ X ] Nontender, [ X ] Nondistended, [X  ] No mass, [X  ] Bowel sounds present, [x  ] Obese  NERVOUS SYSTEM:  [ X ] Alert & Oriented x3, [X  ] Nonfocal, [  ] Confusion, [  ] Encephalopathic, [  ] Sedated, [  ] Unable to assess, [  ] Other-  EXTREMITIES:  [ X ] 2+ Peripheral Pulses, No clubbing, No cyanosis,  [  ] edema B/L lower EXT, [  ] PVD stasis skin changes B/L lower EXT  LYMPH:  No lymphadenopathy noted  SKIN:  [X  ] No rashes or lesions, [  ] Pressure ulcers, [  ] Ecchymosis, [  ] Skin tears, [  ] Other    DIET: DASH    LABS:                        12.0   8.53  )-----------( 150      ( 2020 06:07 )             34.3     2020 06:07    136    |  101    |  14     ----------------------------<  96     3.3     |  29     |  0.81     Ca    8.6        2020 06:07  Mg     2.5       2020 06:07    TPro  7.6    /  Alb  3.2    /  TBili  0.6    /  DBili  x      /  AST  19     /  ALT  14     /  AlkPhos  72     10 Apr 2020 12:40      Urinalysis Basic - ( 2020 22:04 )    Color: Yellow / Appearance: Slightly Turbid / S.005 / pH: x  Gluc: x / Ketone: Small  / Bili: Negative / Urobili: Negative   Blood: x / Protein: 30 mg/dL / Nitrite: Negative   Leuk Esterase: Trace / RBC: x / WBC 3-5   Sq Epi: x / Non Sq Epi: Few / Bacteria: Moderate        Culture Results:   No growth to date. (04-10 @ 00:22)  Culture Results:   No growth to date. (04-10 @ 00:22)      culture blood  -- .Blood Blood-Venous 04-10 @ 00:22    culture urine  --  04-10 @ 00:22      CARDIAC MARKERS ( 2020 21:56 )  <.015 ng/mL / x     / x     / x     / x        Culture - Blood (collected 10 Apr 2020 00:22)  Source: .Blood Blood-Venous  Preliminary Report (2020 01:03):    No growth to date.    Culture - Blood (collected 10 Apr 2020 00:22)  Source: .Blood Blood-Venous  Preliminary Report (2020 01:03):    No growth to date.       Thyroid Panel:  Thyroid Stimulating Hormone, Serum: 1.16 uIU/mL (20 @ 06:07)    Lipase, Serum: 215 U/L (20 @ 21:56)      RADIOLOGY & ADDITIONAL TESTS: NONE    HEALTH ISSUES - PROBLEM Dx:  Advanced directives, counseling/discussion: Advanced directives, counseling/discussion  Need for prophylactic measure: Need for prophylactic measure  Metabolic encephalopathy: Metabolic encephalopathy  Hypokalemia: Hypokalemia  HTN (hypertension): HTN (hypertension)  Breast cancer: Breast cancer        Consultant(s) Notes Reviewed:  [X  ] YES     Care Discussed with [ x ] Consultants, [ X ] Patient, [  ] Family, [X  ] , [ X ] Social Service, [X  ] RN, [  ] Physical Therapy  DVT PPX: [X  ] Lovenox, [  ] S C Heparin, [  ] Coumadin, [  ] Xarelto, [  ] Eliquis, [  ] Pradaxa, [  ] IV Heparin drip, [  ] SCD, [  ] Contraindication secondary to GI Bleed, [  ] Ambulation  Advanced Directive: [X  ] None, [  ] DNR/DNI

## 2022-06-21 ENCOUNTER — APPOINTMENT (OUTPATIENT)
Dept: FAMILY MEDICINE | Facility: HOSPITAL | Age: 57
End: 2022-06-21
Payer: SELF-PAY

## 2022-06-21 ENCOUNTER — OUTPATIENT (OUTPATIENT)
Dept: OUTPATIENT SERVICES | Facility: HOSPITAL | Age: 57
LOS: 1 days | End: 2022-06-21
Payer: SELF-PAY

## 2022-06-21 VITALS
RESPIRATION RATE: 18 BRPM | TEMPERATURE: 97 F | OXYGEN SATURATION: 99 % | BODY MASS INDEX: 27.72 KG/M2 | HEART RATE: 68 BPM | WEIGHT: 164 LBS | SYSTOLIC BLOOD PRESSURE: 160 MMHG | DIASTOLIC BLOOD PRESSURE: 91 MMHG

## 2022-06-21 VITALS — DIASTOLIC BLOOD PRESSURE: 90 MMHG | SYSTOLIC BLOOD PRESSURE: 149 MMHG

## 2022-06-21 DIAGNOSIS — M25.561 PAIN IN RIGHT KNEE: ICD-10-CM

## 2022-06-21 DIAGNOSIS — Z00.00 ENCOUNTER FOR GENERAL ADULT MEDICAL EXAMINATION WITHOUT ABNORMAL FINDINGS: ICD-10-CM

## 2022-06-21 DIAGNOSIS — Z00.00 ENCOUNTER FOR GENERAL ADULT MEDICAL EXAMINATION W/OUT ABNORMAL FINDINGS: ICD-10-CM

## 2022-06-21 DIAGNOSIS — R03.0 ELEVATED BLOOD-PRESSURE READING, W/OUT DIAGNOSIS OF HYPERTENSION: ICD-10-CM

## 2022-06-21 PROCEDURE — 84443 ASSAY THYROID STIM HORMONE: CPT

## 2022-06-21 PROCEDURE — 80061 LIPID PANEL: CPT

## 2022-06-21 PROCEDURE — 83036 HEMOGLOBIN GLYCOSYLATED A1C: CPT

## 2022-06-21 PROCEDURE — 84550 ASSAY OF BLOOD/URIC ACID: CPT

## 2022-06-21 PROCEDURE — G0463: CPT

## 2022-06-21 PROCEDURE — 93010 ELECTROCARDIOGRAM REPORT: CPT

## 2022-06-21 PROCEDURE — 85025 COMPLETE CBC W/AUTO DIFF WBC: CPT

## 2022-06-21 PROCEDURE — 80053 COMPREHEN METABOLIC PANEL: CPT

## 2022-06-21 PROCEDURE — 93005 ELECTROCARDIOGRAM TRACING: CPT

## 2022-06-22 DIAGNOSIS — H52.10 MYOPIA, UNSPECIFIED EYE: ICD-10-CM

## 2022-06-22 DIAGNOSIS — R74.01 ELEVATION OF LEVELS OF LIVER TRANSAMINASE LEVELS: ICD-10-CM

## 2022-06-22 DIAGNOSIS — R00.1 BRADYCARDIA, UNSPECIFIED: ICD-10-CM

## 2022-06-22 DIAGNOSIS — E78.5 HYPERLIPIDEMIA, UNSPECIFIED: ICD-10-CM

## 2022-06-22 DIAGNOSIS — R03.0 ELEVATED BLOOD-PRESSURE READING, WITHOUT DIAGNOSIS OF HYPERTENSION: ICD-10-CM

## 2022-06-22 DIAGNOSIS — M19.049 PRIMARY OSTEOARTHRITIS, UNSPECIFIED HAND: ICD-10-CM

## 2022-06-27 LAB
ALBUMIN SERPL ELPH-MCNC: 5.1 G/DL
ALP BLD-CCNC: 97 U/L
ALT SERPL-CCNC: 31 U/L
ANION GAP SERPL CALC-SCNC: 13 MMOL/L
AST SERPL-CCNC: 28 U/L
BASOPHILS # BLD AUTO: 0.07 K/UL
BASOPHILS NFR BLD AUTO: 1.2 %
BILIRUB SERPL-MCNC: 0.9 MG/DL
BUN SERPL-MCNC: 10 MG/DL
CALCIUM SERPL-MCNC: 9.5 MG/DL
CHLORIDE SERPL-SCNC: 104 MMOL/L
CHOLEST SERPL-MCNC: 279 MG/DL
CO2 SERPL-SCNC: 23 MMOL/L
CREAT SERPL-MCNC: 0.72 MG/DL
EGFR: 109 ML/MIN/1.73M2
EOSINOPHIL # BLD AUTO: 0.26 K/UL
EOSINOPHIL NFR BLD AUTO: 4.5 %
ESTIMATED AVERAGE GLUCOSE: 97 MG/DL
GLUCOSE SERPL-MCNC: 79 MG/DL
HBA1C MFR BLD HPLC: 5 %
HCT VFR BLD CALC: 49.8 %
HDLC SERPL-MCNC: 44 MG/DL
HGB BLD-MCNC: 16.6 G/DL
IMM GRANULOCYTES NFR BLD AUTO: 0.3 %
LDLC SERPL CALC-MCNC: 171 MG/DL
LYMPHOCYTES # BLD AUTO: 2.12 K/UL
LYMPHOCYTES NFR BLD AUTO: 36.7 %
MAN DIFF?: NORMAL
MCHC RBC-ENTMCNC: 31.8 PG
MCHC RBC-ENTMCNC: 33.3 GM/DL
MCV RBC AUTO: 95.4 FL
MONOCYTES # BLD AUTO: 0.45 K/UL
MONOCYTES NFR BLD AUTO: 7.8 %
NEUTROPHILS # BLD AUTO: 2.85 K/UL
NEUTROPHILS NFR BLD AUTO: 49.5 %
NONHDLC SERPL-MCNC: 234 MG/DL
PLATELET # BLD AUTO: 328 K/UL
POTASSIUM SERPL-SCNC: 4.3 MMOL/L
PROT SERPL-MCNC: 7.8 G/DL
RBC # BLD: 5.22 M/UL
RBC # FLD: 13.4 %
SODIUM SERPL-SCNC: 140 MMOL/L
TRIGL SERPL-MCNC: 316 MG/DL
TSH SERPL-ACNC: 1.55 UIU/ML
URATE SERPL-MCNC: 5.6 MG/DL
WBC # FLD AUTO: 5.77 K/UL

## 2022-06-27 NOTE — HISTORY OF PRESENT ILLNESS
[FreeTextEntry8] : 55 y/o male with PMH of obesity, HTN, HLD, sciatica presents for  lightheadedness + dizziness for about  2 months. Happens about one time a week. Does not happen at rest. occurs on exertion. Patient states that he drinks coke and garlic and the feeling goes away. not taking any medications at the moment. Also complaints of inflamed 2nd digit finger on left hand. no history of gout. Denies fevers, chills, n/v, cough, sick contacts, SOB, CP, palpitations,  urinary /bowel changes.\par

## 2022-06-27 NOTE — PLAN
[FreeTextEntry1] : #Sinus Bradycardia\par - infrequent lightheadedness, dizziness\par - EKG: sinus bradycardia\par - ECHO ordered\par - Cardiology referral\par \par #Inflammation of joint of finger\par - Gout vs arthritis vs OA\par - Uric acid level obtained\par - Xray of the hand obtained\par - RTC in 1wwek for follow up\par \par #Elevated Blood pressures ( white coat HTN)\par - todays /90\par - Will continue to exercise and decrease salt in diet\par \par RTC in 1 week for lightheadedness f/u \par \par Case discussed w/ Dr Bear\par

## 2022-06-30 ENCOUNTER — APPOINTMENT (OUTPATIENT)
Dept: FAMILY MEDICINE | Facility: HOSPITAL | Age: 57
End: 2022-06-30

## 2022-06-30 ENCOUNTER — RESULT REVIEW (OUTPATIENT)
Age: 57
End: 2022-06-30

## 2022-06-30 ENCOUNTER — OUTPATIENT (OUTPATIENT)
Dept: OUTPATIENT SERVICES | Facility: HOSPITAL | Age: 57
LOS: 1 days | End: 2022-06-30
Payer: COMMERCIAL

## 2022-06-30 ENCOUNTER — OUTPATIENT (OUTPATIENT)
Dept: OUTPATIENT SERVICES | Facility: HOSPITAL | Age: 57
LOS: 1 days | End: 2022-06-30
Payer: SELF-PAY

## 2022-06-30 VITALS
RESPIRATION RATE: 16 BRPM | SYSTOLIC BLOOD PRESSURE: 154 MMHG | DIASTOLIC BLOOD PRESSURE: 81 MMHG | BODY MASS INDEX: 28.05 KG/M2 | WEIGHT: 166 LBS | TEMPERATURE: 97.3 F | OXYGEN SATURATION: 99 % | HEART RATE: 56 BPM

## 2022-06-30 DIAGNOSIS — E78.5 HYPERLIPIDEMIA, UNSPECIFIED: ICD-10-CM

## 2022-06-30 DIAGNOSIS — Z12.11 ENCOUNTER FOR SCREENING FOR MALIGNANT NEOPLASM OF COLON: ICD-10-CM

## 2022-06-30 DIAGNOSIS — R00.1 BRADYCARDIA, UNSPECIFIED: ICD-10-CM

## 2022-06-30 DIAGNOSIS — R03.0 ELEVATED BLOOD-PRESSURE READING, WITHOUT DIAGNOSIS OF HYPERTENSION: ICD-10-CM

## 2022-06-30 DIAGNOSIS — Z00.00 ENCOUNTER FOR GENERAL ADULT MEDICAL EXAMINATION WITHOUT ABNORMAL FINDINGS: ICD-10-CM

## 2022-06-30 PROCEDURE — 73120 X-RAY EXAM OF HAND: CPT

## 2022-06-30 PROCEDURE — 93306 TTE W/DOPPLER COMPLETE: CPT | Mod: 26

## 2022-06-30 PROCEDURE — 73120 X-RAY EXAM OF HAND: CPT | Mod: 26,RT

## 2022-06-30 PROCEDURE — 93306 TTE W/DOPPLER COMPLETE: CPT

## 2022-06-30 PROCEDURE — G0463: CPT

## 2022-06-30 NOTE — HISTORY OF PRESENT ILLNESS
[FreeTextEntry1] : f/u light headednesss [de-identified] : 57 y/o male with PMH of obesity, bradycardia,  HTN, HLD, sciatica presents follow up of  lightheadedness + dizziness.  Since last visit has not had any episodes of lightheadedness or dizziness. Labs reviewed with patient. LDL high, patient states that he was not fasting buit now he is. Patient has a cardio appointment on 7/22 and still needs to have ECHO done.   Denies fevers, chills, n/v, cough, sick contacts, SOB, CP, palpitations, urinary /bowel changes.

## 2022-06-30 NOTE — PHYSICAL EXAM
[Normal] : no carotid or abdominal bruits heard, no varicosities, pedal pulses are present, no peripheral edema, no extremity clubbing or cyanosis and no palpable aorta [No Acute Distress] : no acute distress [No Respiratory Distress] : no respiratory distress  [Normal Rate] : normal rate  [de-identified] : alireza

## 2022-06-30 NOTE — HISTORY OF PRESENT ILLNESS
[FreeTextEntry1] : f/u light headednesss [de-identified] : 55 y/o male with PMH of obesity, bradycardia,  HTN, HLD, sciatica presents follow up of  lightheadedness + dizziness.  Since last visit has not had any episodes of lightheadedness or dizziness. Labs reviewed with patient. LDL high, patient states that he was not fasting buit now he is. Patient has a cardio appointment on 7/22 and still needs to have ECHO done.   Denies fevers, chills, n/v, cough, sick contacts, SOB, CP, palpitations, urinary /bowel changes.

## 2022-06-30 NOTE — PLAN
[FreeTextEntry1] : # Bradycardia\par - infrequent lightheadedness, dizziness. None since last visit\par - EKG: sinus bradycardia\par - ECHO ordered: patient to go today \par - Cardiology: 7/22/22\par \par #Inflammation of joint of finger\par - Gout vs arthritis vs OA\par - Uric acid level: normal\par - Xray of the hand obtained: patient to go today\par \par #Elevated Blood pressures ( white coat HTN)\par - todays /90\par - Will continue to exercise and decrease salt in diet\par \par #HLD\par - patient with elevated LDL\par - Patient was not fasting, will repeat today\par - consider Atorvastatin on last visit \par \par RTC in cardio clinic in 1 month\par \par Case discussed w/ Dr Martino\par

## 2022-06-30 NOTE — PHYSICAL EXAM
[Normal] : no carotid or abdominal bruits heard, no varicosities, pedal pulses are present, no peripheral edema, no extremity clubbing or cyanosis and no palpable aorta [No Acute Distress] : no acute distress [No Respiratory Distress] : no respiratory distress  [Normal Rate] : normal rate  [de-identified] : alireza

## 2022-07-05 LAB
CHOLEST SERPL-MCNC: 235 MG/DL
HDLC SERPL-MCNC: 46 MG/DL
LDLC SERPL CALC-MCNC: 145 MG/DL
NONHDLC SERPL-MCNC: 189 MG/DL
TRIGL SERPL-MCNC: 218 MG/DL

## 2022-07-12 PROCEDURE — G0463: CPT

## 2022-07-12 PROCEDURE — 80061 LIPID PANEL: CPT

## 2022-07-12 PROCEDURE — 82274 ASSAY TEST FOR BLOOD FECAL: CPT

## 2022-07-15 LAB — HEMOCCULT STL QL IA: NEGATIVE

## 2022-07-22 ENCOUNTER — APPOINTMENT (OUTPATIENT)
Dept: FAMILY MEDICINE | Facility: HOSPITAL | Age: 57
End: 2022-07-22

## 2022-07-22 ENCOUNTER — APPOINTMENT (OUTPATIENT)
Dept: CARDIOLOGY | Facility: HOSPITAL | Age: 57
End: 2022-07-22

## 2022-07-22 ENCOUNTER — OUTPATIENT (OUTPATIENT)
Dept: OUTPATIENT SERVICES | Facility: HOSPITAL | Age: 57
LOS: 1 days | End: 2022-07-22
Payer: SELF-PAY

## 2022-07-22 VITALS
WEIGHT: 168 LBS | BODY MASS INDEX: 28.39 KG/M2 | TEMPERATURE: 98.3 F | RESPIRATION RATE: 16 BRPM | SYSTOLIC BLOOD PRESSURE: 152 MMHG | HEART RATE: 66 BPM | DIASTOLIC BLOOD PRESSURE: 82 MMHG | OXYGEN SATURATION: 98 %

## 2022-07-22 DIAGNOSIS — Z00.00 ENCOUNTER FOR GENERAL ADULT MEDICAL EXAMINATION WITHOUT ABNORMAL FINDINGS: ICD-10-CM

## 2022-07-22 DIAGNOSIS — R03.0 ELEVATED BLOOD-PRESSURE READING, W/OUT DIAGNOSIS OF HYPERTENSION: ICD-10-CM

## 2022-07-22 DIAGNOSIS — E66.3 OVERWEIGHT: ICD-10-CM

## 2022-07-22 PROCEDURE — G0463: CPT

## 2022-07-24 NOTE — HISTORY OF PRESENT ILLNESS
[FreeTextEntry1] : follow up ECHO [de-identified] : 55 y/o male with PMH of obesity, bradycardia, HTN, HLD, sciatica presents follow up of cardio results  Since last visit has not had any episodes of lightheadedness or dizziness. Patient has a cardio appointment on 09/02/22. ECHO reviewed:  Left ventricular ejection fraction, by visual estimation, is 55 to 60%.Normal global left ventricular systolic function.Normal left ventricular internal cavity size. Trace mitral valve regurgitation.  Mild tricuspid regurgitation.  Mild aortic regurgitation. Denies fevers, chills, n/v, cough, sick contacts, SOB, CP, palpitations, urinary /bowel changes.

## 2022-07-24 NOTE — PLAN
[FreeTextEntry1] : # Bradycardia\par - infrequent lightheadedness, dizziness. None since last visit\par - EKG: sinus bradycardia\par - ECHO as above\par - Cardiology: 9/02/22\par \par #Elevated Blood pressures ( white coat HTN)\par - todays /82\par - Will continue to exercise and decrease salt in diet\par - Pt to bring BP home readings next visit\par \par #GERD\par - h/o of reflux in the past\par - Will restart Omeprazole for 2 months\par - Simethicone for gassiness. \par \par RTC in cardio clinic in 1-2 month\par \par Case discussed w/ Dr Martino\par

## 2022-07-25 DIAGNOSIS — R03.0 ELEVATED BLOOD-PRESSURE READING, WITHOUT DIAGNOSIS OF HYPERTENSION: ICD-10-CM

## 2022-07-25 DIAGNOSIS — K21.9 GASTRO-ESOPHAGEAL REFLUX DISEASE WITHOUT ESOPHAGITIS: ICD-10-CM

## 2022-07-25 DIAGNOSIS — R00.1 BRADYCARDIA, UNSPECIFIED: ICD-10-CM

## 2022-07-25 DIAGNOSIS — E66.3 OVERWEIGHT: ICD-10-CM

## 2022-08-17 ENCOUNTER — APPOINTMENT (OUTPATIENT)
Dept: FAMILY MEDICINE | Facility: HOSPITAL | Age: 57
End: 2022-08-17

## 2022-09-02 ENCOUNTER — APPOINTMENT (OUTPATIENT)
Dept: CARDIOLOGY | Facility: HOSPITAL | Age: 57
End: 2022-09-02

## 2023-03-03 NOTE — ED ADULT NURSE NOTE - NSFALLRSKASSESASSIST_ED_ALL_ED
Impression: Age-related nuclear cataract, bilateral: H25.13.  Plan: Monitor, seems to be affecting vision but ? if cause of why not seeing better, needs to consult w/ cat surgeon for further eval and tx, pt has normal oct mac oU no

## 2024-03-04 ENCOUNTER — RESULT CHARGE (OUTPATIENT)
Age: 59
End: 2024-03-04

## 2024-03-05 ENCOUNTER — LABORATORY RESULT (OUTPATIENT)
Age: 59
End: 2024-03-05

## 2024-03-05 ENCOUNTER — APPOINTMENT (OUTPATIENT)
Dept: FAMILY MEDICINE | Facility: HOSPITAL | Age: 59
End: 2024-03-05

## 2024-03-05 ENCOUNTER — OUTPATIENT (OUTPATIENT)
Dept: OUTPATIENT SERVICES | Facility: HOSPITAL | Age: 59
LOS: 1 days | End: 2024-03-05
Payer: SELF-PAY

## 2024-03-05 VITALS
BODY MASS INDEX: 27.88 KG/M2 | RESPIRATION RATE: 16 BRPM | WEIGHT: 165 LBS | OXYGEN SATURATION: 98 % | TEMPERATURE: 98 F | HEART RATE: 74 BPM | DIASTOLIC BLOOD PRESSURE: 90 MMHG | SYSTOLIC BLOOD PRESSURE: 167 MMHG

## 2024-03-05 VITALS — DIASTOLIC BLOOD PRESSURE: 83 MMHG | SYSTOLIC BLOOD PRESSURE: 159 MMHG

## 2024-03-05 DIAGNOSIS — R74.01 ELEVATION OF LEVELS OF LIVER TRANSAMINASE LEVELS: ICD-10-CM

## 2024-03-05 DIAGNOSIS — Z86.69 PERSONAL HISTORY OF OTHER DISEASES OF THE NERVOUS SYSTEM AND SENSE ORGANS: ICD-10-CM

## 2024-03-05 DIAGNOSIS — M19.049 PRIMARY OSTEOARTHRITIS, UNSPECIFIED HAND: ICD-10-CM

## 2024-03-05 DIAGNOSIS — Z00.00 ENCOUNTER FOR GENERAL ADULT MEDICAL EXAMINATION W/OUT ABNORMAL FINDINGS: ICD-10-CM

## 2024-03-05 DIAGNOSIS — Z87.898 PERSONAL HISTORY OF OTHER SPECIFIED CONDITIONS: ICD-10-CM

## 2024-03-05 DIAGNOSIS — Z11.3 ENCOUNTER FOR SCREENING FOR INFECTIONS WITH A PREDOMINANTLY SEXUAL MODE OF TRANSMISSION: ICD-10-CM

## 2024-03-05 DIAGNOSIS — Z00.00 ENCOUNTER FOR GENERAL ADULT MEDICAL EXAMINATION WITHOUT ABNORMAL FINDINGS: ICD-10-CM

## 2024-03-05 DIAGNOSIS — R30.0 DYSURIA: ICD-10-CM

## 2024-03-05 DIAGNOSIS — Z87.19 PERSONAL HISTORY OF OTHER DISEASES OF THE DIGESTIVE SYSTEM: ICD-10-CM

## 2024-03-05 DIAGNOSIS — M02.369: ICD-10-CM

## 2024-03-05 PROCEDURE — 81002 URINALYSIS NONAUTO W/O SCOPE: CPT

## 2024-03-05 PROCEDURE — 80061 LIPID PANEL: CPT

## 2024-03-05 PROCEDURE — 86593 SYPHILIS TEST NON-TREP QUANT: CPT

## 2024-03-05 PROCEDURE — 85025 COMPLETE CBC W/AUTO DIFF WBC: CPT

## 2024-03-05 PROCEDURE — 83036 HEMOGLOBIN GLYCOSYLATED A1C: CPT

## 2024-03-05 PROCEDURE — 86803 HEPATITIS C AB TEST: CPT

## 2024-03-05 PROCEDURE — 86592 SYPHILIS TEST NON-TREP QUAL: CPT

## 2024-03-05 PROCEDURE — 86780 TREPONEMA PALLIDUM: CPT

## 2024-03-05 PROCEDURE — 80053 COMPREHEN METABOLIC PANEL: CPT

## 2024-03-05 PROCEDURE — G0463: CPT

## 2024-03-05 PROCEDURE — 36415 COLL VENOUS BLD VENIPUNCTURE: CPT

## 2024-03-05 PROCEDURE — 87389 HIV-1 AG W/HIV-1&-2 AB AG IA: CPT

## 2024-03-05 RX ORDER — OMEPRAZOLE 40 MG/1
40 CAPSULE, DELAYED RELEASE ORAL
Qty: 30 | Refills: 1 | Status: COMPLETED | COMMUNITY
Start: 2022-07-22 | End: 2024-03-05

## 2024-03-05 RX ORDER — SIMETHICONE 80 MG/1
80 TABLET, CHEWABLE ORAL
Qty: 120 | Refills: 0 | Status: COMPLETED | COMMUNITY
Start: 2022-07-22 | End: 2024-03-05

## 2024-03-05 NOTE — PHYSICAL EXAM
[Normal Outer Ear/Nose] : the outer ears and nose were normal in appearance [No Varicosities] : no varicosities [No Extremity Clubbing/Cyanosis] : no extremity clubbing/cyanosis [Pedal Pulses Present] : the pedal pulses are present [Normal] : soft, non-tender, non-distended, no masses palpated, no HSM and normal bowel sounds [Urethral Meatus] : meatus normal [Scrotum] : the scrotum was normal [Rectal Exam - Seminal Vesicles] : the seminal vesicles were normal [Testes Tenderness] : no tenderness of the testes [No CVA Tenderness] : no CVA  tenderness [Normal Affect] : the affect was normal [Normal Gait] : normal gait

## 2024-03-08 DIAGNOSIS — R03.0 ELEVATED BLOOD-PRESSURE READING, WITHOUT DIAGNOSIS OF HYPERTENSION: ICD-10-CM

## 2024-03-08 DIAGNOSIS — M02.369: ICD-10-CM

## 2024-03-08 DIAGNOSIS — R30.0 DYSURIA: ICD-10-CM

## 2024-03-13 ENCOUNTER — NON-APPOINTMENT (OUTPATIENT)
Age: 59
End: 2024-03-13

## 2024-03-15 LAB
ALBUMIN SERPL ELPH-MCNC: 4.6 G/DL
ALP BLD-CCNC: 101 U/L
ALT SERPL-CCNC: 24 U/L
ANION GAP SERPL CALC-SCNC: 10 MMOL/L
AST SERPL-CCNC: 21 U/L
BASOPHILS # BLD AUTO: 0.08 K/UL
BASOPHILS NFR BLD AUTO: 1.2 %
BILIRUB SERPL-MCNC: 0.8 MG/DL
BILIRUB UR QL STRIP: NEGATIVE
BUN SERPL-MCNC: 10 MG/DL
C TRACH RRNA SPEC QL NAA+PROBE: NOT DETECTED
CALCIUM SERPL-MCNC: 9.3 MG/DL
CHLORIDE SERPL-SCNC: 102 MMOL/L
CHOLEST SERPL-MCNC: 249 MG/DL
CLARITY UR: CLEAR
CO2 SERPL-SCNC: 26 MMOL/L
COLLECTION METHOD: NORMAL
CREAT SERPL-MCNC: 0.76 MG/DL
EGFR: 105 ML/MIN/1.73M2
EOSINOPHIL # BLD AUTO: 0.36 K/UL
EOSINOPHIL NFR BLD AUTO: 5.6 %
ESTIMATED AVERAGE GLUCOSE: 105 MG/DL
GLUCOSE SERPL-MCNC: 89 MG/DL
GLUCOSE UR-MCNC: NEGATIVE
HBA1C MFR BLD HPLC: 5.3 %
HCG UR QL: 0.2 EU/DL
HCT VFR BLD CALC: 47 %
HCV AB SER QL: NONREACTIVE
HCV S/CO RATIO: 0.22 S/CO
HDLC SERPL-MCNC: 44 MG/DL
HGB BLD-MCNC: 16.5 G/DL
HGB UR QL STRIP.AUTO: NEGATIVE
HIV1+2 AB SPEC QL IA.RAPID: NONREACTIVE
IMM GRANULOCYTES NFR BLD AUTO: 0.6 %
KETONES UR-MCNC: NEGATIVE
LDLC SERPL CALC-MCNC: 151 MG/DL
LEUKOCYTE ESTERASE UR QL STRIP: NEGATIVE
LYMPHOCYTES # BLD AUTO: 2.11 K/UL
LYMPHOCYTES NFR BLD AUTO: 32.7 %
MAN DIFF?: NORMAL
MCHC RBC-ENTMCNC: 31.5 PG
MCHC RBC-ENTMCNC: 35.1 GM/DL
MCV RBC AUTO: 89.7 FL
MONOCYTES # BLD AUTO: 0.56 K/UL
MONOCYTES NFR BLD AUTO: 8.7 %
N GONORRHOEA RRNA SPEC QL NAA+PROBE: NOT DETECTED
NEUTROPHILS # BLD AUTO: 3.31 K/UL
NEUTROPHILS NFR BLD AUTO: 51.2 %
NITRITE UR QL STRIP: NEGATIVE
NONHDLC SERPL-MCNC: 205 MG/DL
PH UR STRIP: 7
PLATELET # BLD AUTO: 350 K/UL
POTASSIUM SERPL-SCNC: 4.2 MMOL/L
PROT SERPL-MCNC: 7.3 G/DL
PROT UR STRIP-MCNC: NEGATIVE
RBC # BLD: 5.24 M/UL
RBC # FLD: 13.3 %
SODIUM SERPL-SCNC: 138 MMOL/L
SOURCE AMPLIFICATION: NORMAL
SP GR UR STRIP: 1.01
T PALLIDUM AB SER QL IA: POSITIVE
TRIGL SERPL-MCNC: 291 MG/DL
WBC # FLD AUTO: 6.46 K/UL

## 2024-03-18 DIAGNOSIS — Z00.00 ENCOUNTER FOR GENERAL ADULT MEDICAL EXAMINATION W/OUT ABNORMAL FINDINGS: ICD-10-CM

## 2024-03-18 NOTE — COUNSELING
[Benefits of weight loss discussed] : Benefits of weight loss discussed [Potential consequences of obesity discussed] : Potential consequences of obesity discussed [Encouraged to increase physical activity] : Encouraged to increase physical activity [Encouraged to maintain food diary] : Encouraged to maintain food diary

## 2024-03-19 ENCOUNTER — OUTPATIENT (OUTPATIENT)
Dept: OUTPATIENT SERVICES | Facility: HOSPITAL | Age: 59
LOS: 1 days | End: 2024-03-19
Payer: COMMERCIAL

## 2024-03-19 ENCOUNTER — APPOINTMENT (OUTPATIENT)
Dept: FAMILY MEDICINE | Facility: HOSPITAL | Age: 59
End: 2024-03-19

## 2024-03-19 ENCOUNTER — OUTPATIENT (OUTPATIENT)
Dept: OUTPATIENT SERVICES | Facility: HOSPITAL | Age: 59
LOS: 1 days | End: 2024-03-19
Payer: SELF-PAY

## 2024-03-19 VITALS
OXYGEN SATURATION: 99 % | BODY MASS INDEX: 27.88 KG/M2 | DIASTOLIC BLOOD PRESSURE: 93 MMHG | WEIGHT: 165 LBS | HEART RATE: 83 BPM | RESPIRATION RATE: 16 BRPM | SYSTOLIC BLOOD PRESSURE: 164 MMHG | TEMPERATURE: 98.4 F

## 2024-03-19 VITALS — SYSTOLIC BLOOD PRESSURE: 165 MMHG | DIASTOLIC BLOOD PRESSURE: 92 MMHG

## 2024-03-19 DIAGNOSIS — Z00.00 ENCOUNTER FOR GENERAL ADULT MEDICAL EXAMINATION WITHOUT ABNORMAL FINDINGS: ICD-10-CM

## 2024-03-19 DIAGNOSIS — A53.9 SYPHILIS, UNSPECIFIED: ICD-10-CM

## 2024-03-19 DIAGNOSIS — I10 ESSENTIAL (PRIMARY) HYPERTENSION: ICD-10-CM

## 2024-03-19 DIAGNOSIS — Z12.11 ENCOUNTER FOR SCREENING FOR MALIGNANT NEOPLASM OF COLON: ICD-10-CM

## 2024-03-19 PROCEDURE — G0463: CPT

## 2024-03-19 RX ORDER — BLOOD PRESSURE TEST KIT-LARGE
KIT MISCELLANEOUS
Qty: 1 | Refills: 0 | Status: ACTIVE | COMMUNITY
Start: 2024-03-19 | End: 1900-01-01

## 2024-03-19 NOTE — PHYSICAL EXAM
[No Masses] : no abdominal mass palpated [Normal] : normal rate, regular rhythm, normal S1 and S2 and no murmur heard [Pedal Pulses Present] : the pedal pulses are present [No Edema] : there was no peripheral edema [Soft] : abdomen soft [Non Tender] : non-tender [Non-distended] : non-distended [No Joint Swelling] : no joint swelling [Grossly Normal Strength/Tone] : grossly normal strength/tone [No Rash] : no rash

## 2024-03-22 ENCOUNTER — NON-APPOINTMENT (OUTPATIENT)
Age: 59
End: 2024-03-22

## 2024-03-23 NOTE — REVIEW OF SYSTEMS
[Dysuria] : dysuria [Joint Pain] : joint pain [Negative] : Gastrointestinal [Incontinence] : no incontinence [Hesitancy] : no hesitancy [Hematuria] : no hematuria [Frequency] : no frequency [Joint Stiffness] : no joint stiffness [Headache] : no headache [Joint Swelling] : no joint swelling [Dizziness] : no dizziness

## 2024-03-23 NOTE — HISTORY OF PRESENT ILLNESS
[de-identified] : 58 year old M with no pertinent PMHx presenting to clinic for b/l knee pain and dysuria. Reports knee pain has been present x2 years but over the last few weeks he's noted worsening right knee pain. States he feels the knee "catching" when walking upstairs. Otherwise no trauma to the area, no falls.  Also reports burning with urination that occurs intermittently, especially at night. States he feels it might be 2/2 to dehydration as symptoms resolve if he drinks enough water that day. Otherwise well appearing in NAD.

## 2024-03-23 NOTE — HEALTH RISK ASSESSMENT
[Good] : ~his/her~  mood as  good [Never (0 pts)] : Never (0 points) [PHQ-2 Negative - No further assessment needed] : PHQ-2 Negative - No further assessment needed [0] : 2) Feeling down, depressed, or hopeless: Not at all (0) [Hepatitis C test offered] : Hepatitis C test offered [HIV Test offered] : HIV Test offered [None] : None [Alone] : lives alone [Employed] : employed [Single] : single [] :  [# Of Children ___] : has [unfilled] children [High Risk Behavior] : high risk behavior [Feels Safe at Home] : Feels safe at home [Fully functional (bathing, dressing, toileting, transferring, walking, feeding)] : Fully functional (bathing, dressing, toileting, transferring, walking, feeding) [Fully functional (using the telephone, shopping, preparing meals, housekeeping, doing laundry, using] : Fully functional and needs no help or supervision to perform IADLs (using the telephone, shopping, preparing meals, housekeeping, doing laundry, using transportation, managing medications and managing finances) [Never] : Never [Change in mental status noted] : No change in mental status noted [Language] : denies difficulty with language [Sexually Active] : not sexually active [de-identified] : +1 sexual partner 2 months ago of unknown STD hx

## 2024-03-26 ENCOUNTER — OUTPATIENT (OUTPATIENT)
Dept: OUTPATIENT SERVICES | Facility: HOSPITAL | Age: 59
LOS: 1 days | End: 2024-03-26
Payer: SELF-PAY

## 2024-03-26 ENCOUNTER — APPOINTMENT (OUTPATIENT)
Dept: FAMILY MEDICINE | Facility: HOSPITAL | Age: 59
End: 2024-03-26

## 2024-03-26 VITALS
BODY MASS INDEX: 28.05 KG/M2 | SYSTOLIC BLOOD PRESSURE: 166 MMHG | TEMPERATURE: 98.5 F | DIASTOLIC BLOOD PRESSURE: 91 MMHG | WEIGHT: 166 LBS | RESPIRATION RATE: 17 BRPM | HEART RATE: 90 BPM | OXYGEN SATURATION: 98 %

## 2024-03-26 VITALS — HEART RATE: 69 BPM | SYSTOLIC BLOOD PRESSURE: 146 MMHG | DIASTOLIC BLOOD PRESSURE: 87 MMHG

## 2024-03-26 DIAGNOSIS — M25.562 PAIN IN RIGHT KNEE: ICD-10-CM

## 2024-03-26 DIAGNOSIS — Z00.00 ENCOUNTER FOR GENERAL ADULT MEDICAL EXAMINATION WITHOUT ABNORMAL FINDINGS: ICD-10-CM

## 2024-03-26 DIAGNOSIS — R03.0 ELEVATED BLOOD-PRESSURE READING, W/OUT DIAGNOSIS OF HYPERTENSION: ICD-10-CM

## 2024-03-26 DIAGNOSIS — E78.5 HYPERLIPIDEMIA, UNSPECIFIED: ICD-10-CM

## 2024-03-26 DIAGNOSIS — I10 ESSENTIAL (PRIMARY) HYPERTENSION: ICD-10-CM

## 2024-03-26 DIAGNOSIS — M25.561 PAIN IN RIGHT KNEE: ICD-10-CM

## 2024-03-26 DIAGNOSIS — A53.0 LATENT SYPHILIS, UNSPECIFIED AS EARLY OR LATE: ICD-10-CM

## 2024-03-26 PROCEDURE — 96372 THER/PROPH/DIAG INJ SC/IM: CPT

## 2024-03-26 PROCEDURE — G0463: CPT

## 2024-03-26 PROCEDURE — 82274 ASSAY TEST FOR BLOOD FECAL: CPT

## 2024-03-26 RX ORDER — PENICILLIN G BENZATHINE 2400000 [IU]/4ML
2400000 INJECTION, SUSPENSION INTRAMUSCULAR
Qty: 0 | Refills: 0 | Status: COMPLETED | OUTPATIENT
Start: 2024-03-26

## 2024-03-26 RX ADMIN — PENICILLIN G BENZATHINE 0 UNIT/4ML: 1200000 INJECTION, SUSPENSION INTRAMUSCULAR at 00:00

## 2024-03-26 NOTE — PHYSICAL EXAM
[Normal Gait] : normal gait [Normal] : no joint swelling and grossly normal strength and tone [Normal Affect] : the affect was normal

## 2024-03-28 LAB — HEMOCCULT STL QL IA: NEGATIVE

## 2024-04-02 ENCOUNTER — APPOINTMENT (OUTPATIENT)
Dept: FAMILY MEDICINE | Facility: HOSPITAL | Age: 59
End: 2024-04-02

## 2024-04-02 ENCOUNTER — OUTPATIENT (OUTPATIENT)
Dept: OUTPATIENT SERVICES | Facility: HOSPITAL | Age: 59
LOS: 1 days | End: 2024-04-02
Payer: SELF-PAY

## 2024-04-02 VITALS
WEIGHT: 163 LBS | TEMPERATURE: 98.4 F | HEIGHT: 64.5 IN | HEART RATE: 76 BPM | RESPIRATION RATE: 14 BRPM | OXYGEN SATURATION: 97 % | BODY MASS INDEX: 27.49 KG/M2 | DIASTOLIC BLOOD PRESSURE: 81 MMHG | SYSTOLIC BLOOD PRESSURE: 141 MMHG

## 2024-04-02 DIAGNOSIS — Z00.00 ENCOUNTER FOR GENERAL ADULT MEDICAL EXAMINATION WITHOUT ABNORMAL FINDINGS: ICD-10-CM

## 2024-04-02 DIAGNOSIS — A53.9 SYPHILIS, UNSPECIFIED: ICD-10-CM

## 2024-04-02 DIAGNOSIS — I10 ESSENTIAL (PRIMARY) HYPERTENSION: ICD-10-CM

## 2024-04-02 PROBLEM — A53.0 LATENT SYPHILIS: Status: ACTIVE | Noted: 2024-03-19

## 2024-04-02 PROCEDURE — 96372 THER/PROPH/DIAG INJ SC/IM: CPT

## 2024-04-02 PROCEDURE — G0463: CPT

## 2024-04-02 RX ORDER — NAPROXEN 500 MG/1
500 TABLET ORAL
Qty: 10 | Refills: 0 | Status: COMPLETED | COMMUNITY
Start: 2024-03-05 | End: 2024-04-02

## 2024-04-02 RX ADMIN — PENICILLIN G BENZATHINE 0 UNIT/4ML: 2400000 INJECTION, SUSPENSION INTRAMUSCULAR at 00:00

## 2024-04-02 NOTE — PHYSICAL EXAM
[Normal] : soft, non-tender, non-distended, no masses palpated, no HSM and normal bowel sounds [Grossly Normal Strength/Tone] : grossly normal strength/tone [No CVA Tenderness] : no CVA  tenderness [Normal Gait] : normal gait [Normal Affect] : the affect was normal

## 2024-04-03 NOTE — HISTORY OF PRESENT ILLNESS
[FreeTextEntry1] : latent syphilis  [de-identified] : 58 year old male presents for f/u of positive syphilis screen. Patient reports no genitourinary symptoms and denies ulcers at this time. Pt reports no history of positive syphilis in the past.

## 2024-04-03 NOTE — END OF VISIT
[FreeTextEntry3] : If the patient BP stays higher than desire level, will adjust med on next visit. [] : Resident

## 2024-04-21 NOTE — HISTORY OF PRESENT ILLNESS
[FreeTextEntry8] : 58 year old male presents for CSP Fit test. Patient denies abdominal pain, constipation, N/V, melena or hematochezia at this time.  [FreeTextEntry1] : syphillis [de-identified] : 58 year old male presents for f/u of positive syphilis screen. Patient reports no genitourinary symptoms and denies ulcers at this time. Pt reports no history of positive syphilis in the past.

## 2024-04-21 NOTE — HISTORY OF PRESENT ILLNESS
[FreeTextEntry8] : 58 year old male presents for CSP Fit test. Patient denies abdominal pain, constipation, N/V, melena or hematochezia at this time.  [FreeTextEntry1] : syphillis [de-identified] : 58 year old male presents for f/u of positive syphilis screen. Patient reports no genitourinary symptoms and denies ulcers at this time. Pt reports no history of positive syphilis in the past.

## 2024-05-17 NOTE — HISTORY OF PRESENT ILLNESS
[FreeTextEntry1] : syphilis  [de-identified] : 58 year old male presents for f/u of positive syphilis screen. Patient reports no genitourinary symptoms and denies ulcers at this time. Pt reports no history of positive syphilis in the past.  DISPLAY PLAN FREE TEXT

## 2024-05-29 RX ORDER — LISINOPRIL 20 MG/1
20 TABLET ORAL DAILY
Qty: 2 | Refills: 0 | Status: ACTIVE | COMMUNITY
Start: 2024-03-19 | End: 1900-01-01

## 2024-07-09 ENCOUNTER — APPOINTMENT (OUTPATIENT)
Dept: FAMILY MEDICINE | Facility: HOSPITAL | Age: 59
End: 2024-07-09

## 2024-07-09 ENCOUNTER — OUTPATIENT (OUTPATIENT)
Dept: OUTPATIENT SERVICES | Facility: HOSPITAL | Age: 59
LOS: 1 days | End: 2024-07-09
Payer: SELF-PAY

## 2024-07-09 VITALS
RESPIRATION RATE: 14 BRPM | WEIGHT: 166 LBS | BODY MASS INDEX: 28.05 KG/M2 | TEMPERATURE: 98.5 F | OXYGEN SATURATION: 96 % | DIASTOLIC BLOOD PRESSURE: 73 MMHG | HEART RATE: 72 BPM | SYSTOLIC BLOOD PRESSURE: 124 MMHG

## 2024-07-09 DIAGNOSIS — I10 ESSENTIAL (PRIMARY) HYPERTENSION: ICD-10-CM

## 2024-07-09 DIAGNOSIS — Z87.898 PERSONAL HISTORY OF OTHER SPECIFIED CONDITIONS: ICD-10-CM

## 2024-07-09 DIAGNOSIS — E78.5 HYPERLIPIDEMIA, UNSPECIFIED: ICD-10-CM

## 2024-07-09 DIAGNOSIS — Z00.00 ENCOUNTER FOR GENERAL ADULT MEDICAL EXAMINATION WITHOUT ABNORMAL FINDINGS: ICD-10-CM

## 2024-07-09 DIAGNOSIS — A53.0 LATENT SYPHILIS, UNSPECIFIED AS EARLY OR LATE: ICD-10-CM

## 2024-07-09 DIAGNOSIS — M54.30 SCIATICA, UNSPECIFIED SIDE: ICD-10-CM

## 2024-07-09 PROCEDURE — G0463: CPT

## 2024-10-15 ENCOUNTER — NON-APPOINTMENT (OUTPATIENT)
Age: 59
End: 2024-10-15

## 2025-02-11 ENCOUNTER — APPOINTMENT (OUTPATIENT)
Age: 60
End: 2025-02-11

## 2025-02-11 ENCOUNTER — RESULT CHARGE (OUTPATIENT)
Age: 60
End: 2025-02-11

## 2025-02-11 ENCOUNTER — OUTPATIENT (OUTPATIENT)
Dept: OUTPATIENT SERVICES | Facility: HOSPITAL | Age: 60
LOS: 1 days | End: 2025-02-11
Payer: SELF-PAY

## 2025-02-11 VITALS
TEMPERATURE: 98.2 F | RESPIRATION RATE: 14 BRPM | BODY MASS INDEX: 27.83 KG/M2 | OXYGEN SATURATION: 98 % | HEIGHT: 64.5 IN | WEIGHT: 165 LBS | DIASTOLIC BLOOD PRESSURE: 86 MMHG | HEART RATE: 90 BPM | SYSTOLIC BLOOD PRESSURE: 166 MMHG

## 2025-02-11 DIAGNOSIS — Z00.00 ENCOUNTER FOR GENERAL ADULT MEDICAL EXAMINATION WITHOUT ABNORMAL FINDINGS: ICD-10-CM

## 2025-02-11 DIAGNOSIS — L98.9 DISORDER OF THE SKIN AND SUBCUTANEOUS TISSUE, UNSPECIFIED: ICD-10-CM

## 2025-02-11 DIAGNOSIS — N50.89 OTHER SPECIFIED DISORDERS OF THE MALE GENITAL ORGANS: ICD-10-CM

## 2025-02-11 DIAGNOSIS — H53.9 UNSPECIFIED VISUAL DISTURBANCE: ICD-10-CM

## 2025-02-11 DIAGNOSIS — A53.0 LATENT SYPHILIS, UNSPECIFIED AS EARLY OR LATE: ICD-10-CM

## 2025-02-11 LAB — GLUCOSE BLDC GLUCOMTR-MCNC: 108

## 2025-02-18 ENCOUNTER — APPOINTMENT (OUTPATIENT)
Age: 60
End: 2025-02-18

## 2025-02-18 ENCOUNTER — OUTPATIENT (OUTPATIENT)
Dept: OUTPATIENT SERVICES | Facility: HOSPITAL | Age: 60
LOS: 1 days | End: 2025-02-18

## 2025-02-18 ENCOUNTER — OUTPATIENT (OUTPATIENT)
Dept: OUTPATIENT SERVICES | Facility: HOSPITAL | Age: 60
LOS: 1 days | End: 2025-02-18
Payer: SELF-PAY

## 2025-02-18 VITALS
OXYGEN SATURATION: 99 % | BODY MASS INDEX: 28.51 KG/M2 | RESPIRATION RATE: 14 BRPM | DIASTOLIC BLOOD PRESSURE: 90 MMHG | HEIGHT: 64 IN | WEIGHT: 167 LBS | SYSTOLIC BLOOD PRESSURE: 171 MMHG | TEMPERATURE: 97.9 F | HEART RATE: 60 BPM

## 2025-02-18 VITALS — SYSTOLIC BLOOD PRESSURE: 175 MMHG | DIASTOLIC BLOOD PRESSURE: 92 MMHG

## 2025-02-18 DIAGNOSIS — E78.5 HYPERLIPIDEMIA, UNSPECIFIED: ICD-10-CM

## 2025-02-18 DIAGNOSIS — L98.9 DISORDER OF THE SKIN AND SUBCUTANEOUS TISSUE, UNSPECIFIED: ICD-10-CM

## 2025-02-18 DIAGNOSIS — Z00.00 ENCOUNTER FOR GENERAL ADULT MEDICAL EXAMINATION WITHOUT ABNORMAL FINDINGS: ICD-10-CM

## 2025-02-18 DIAGNOSIS — I10 ESSENTIAL (PRIMARY) HYPERTENSION: ICD-10-CM

## 2025-02-18 PROCEDURE — G0463: CPT

## 2025-02-18 PROCEDURE — 86593 SYPHILIS TEST NON-TREP QUANT: CPT

## 2025-02-18 PROCEDURE — 80061 LIPID PANEL: CPT

## 2025-02-18 PROCEDURE — 36415 COLL VENOUS BLD VENIPUNCTURE: CPT

## 2025-02-18 PROCEDURE — 11104 PUNCH BX SKIN SINGLE LESION: CPT

## 2025-02-18 RX ORDER — LOSARTAN POTASSIUM 25 MG/1
25 TABLET, FILM COATED ORAL DAILY
Qty: 90 | Refills: 2 | Status: ACTIVE | COMMUNITY
Start: 2025-02-18 | End: 1900-01-01

## 2025-02-19 PROBLEM — L98.9 SKIN LESION: Status: ACTIVE | Noted: 2025-02-11

## 2025-02-19 LAB
CHOLEST SERPL-MCNC: 231 MG/DL
CORE LAB BIOPSY: NORMAL
HDLC SERPL-MCNC: 37 MG/DL
LDLC SERPL CALC-MCNC: 151 MG/DL
NONHDLC SERPL-MCNC: 194 MG/DL
TRIGL SERPL-MCNC: 237 MG/DL

## 2025-02-21 ENCOUNTER — NON-APPOINTMENT (OUTPATIENT)
Age: 60
End: 2025-02-21

## 2025-02-21 LAB — RPR SER-TITR: NORMAL

## 2025-02-25 ENCOUNTER — OUTPATIENT (OUTPATIENT)
Dept: OUTPATIENT SERVICES | Facility: HOSPITAL | Age: 60
LOS: 1 days | End: 2025-02-25
Payer: SELF-PAY

## 2025-02-25 ENCOUNTER — APPOINTMENT (OUTPATIENT)
Age: 60
End: 2025-02-25

## 2025-02-25 VITALS
DIASTOLIC BLOOD PRESSURE: 67 MMHG | SYSTOLIC BLOOD PRESSURE: 135 MMHG | BODY MASS INDEX: 28.67 KG/M2 | RESPIRATION RATE: 15 BRPM | TEMPERATURE: 98 F | WEIGHT: 167 LBS | HEART RATE: 60 BPM | OXYGEN SATURATION: 98 %

## 2025-02-25 DIAGNOSIS — I10 ESSENTIAL (PRIMARY) HYPERTENSION: ICD-10-CM

## 2025-02-25 DIAGNOSIS — H53.9 UNSPECIFIED VISUAL DISTURBANCE: ICD-10-CM

## 2025-02-25 DIAGNOSIS — E78.5 HYPERLIPIDEMIA, UNSPECIFIED: ICD-10-CM

## 2025-02-25 DIAGNOSIS — Z13.1 ENCOUNTER FOR SCREENING FOR DIABETES MELLITUS: ICD-10-CM

## 2025-02-25 DIAGNOSIS — Z00.00 ENCOUNTER FOR GENERAL ADULT MEDICAL EXAMINATION WITHOUT ABNORMAL FINDINGS: ICD-10-CM

## 2025-02-25 PROCEDURE — G0463: CPT

## 2025-03-18 NOTE — ED PROVIDER NOTE - NS_EDPROVIDERDISPOUSERTYPE_ED_A_ED
[FreeTextEntry1] : Physical  UTD with Mammo, DEXA, PAP, colonoscopy  high risk for DM reinforced the importance of following a low carb/low sugar diet we'll check A1c today  elevated cholesterol  reinforced the importance of following a low cholesterol/low fat diet we'll check lipids today  blood work ordered follow up in one week for lab results Attending Attestation (For Attendings USE Only)...